# Patient Record
Sex: MALE | Race: WHITE | Employment: OTHER | URBAN - NONMETROPOLITAN AREA
[De-identification: names, ages, dates, MRNs, and addresses within clinical notes are randomized per-mention and may not be internally consistent; named-entity substitution may affect disease eponyms.]

---

## 2021-07-05 ENCOUNTER — HOSPITAL ENCOUNTER (EMERGENCY)
Age: 41
Discharge: HOME | End: 2021-07-05
Attending: FAMILY MEDICINE
Payer: MEDICAID

## 2021-07-05 VITALS
SYSTOLIC BLOOD PRESSURE: 150 MMHG | DIASTOLIC BLOOD PRESSURE: 97 MMHG | RESPIRATION RATE: 16 BRPM | WEIGHT: 210 LBS | TEMPERATURE: 98.1 F | HEART RATE: 97 BPM | OXYGEN SATURATION: 97 %

## 2021-07-05 DIAGNOSIS — M25.572 LEFT ANKLE PAIN, UNSPECIFIED CHRONICITY: Primary | ICD-10-CM

## 2021-07-05 PROCEDURE — 99283 EMERGENCY DEPT VISIT LOW MDM: CPT

## 2021-07-05 RX ORDER — HYDROCODONE BITARTRATE AND ACETAMINOPHEN 5; 325 MG/1; MG/1
1 TABLET ORAL EVERY 8 HOURS PRN
Qty: 6 TABLET | Refills: 0 | Status: SHIPPED | OUTPATIENT
Start: 2021-07-05 | End: 2021-07-07

## 2021-07-05 RX ORDER — IBUPROFEN 800 MG/1
800 TABLET ORAL EVERY 8 HOURS PRN
Qty: 30 TABLET | Refills: 0 | Status: SHIPPED | OUTPATIENT
Start: 2021-07-05

## 2021-07-05 ASSESSMENT — PAIN DESCRIPTION - ORIENTATION: ORIENTATION: LEFT;POSTERIOR

## 2021-07-05 ASSESSMENT — PAIN SCALES - GENERAL: PAINLEVEL_OUTOF10: 7

## 2021-07-05 ASSESSMENT — PAIN DESCRIPTION - PAIN TYPE: TYPE: ACUTE PAIN

## 2021-07-05 ASSESSMENT — PAIN DESCRIPTION - LOCATION: LOCATION: ANKLE

## 2021-07-05 ASSESSMENT — PAIN DESCRIPTION - DESCRIPTORS: DESCRIPTORS: ACHING

## 2021-07-05 NOTE — ED PROVIDER NOTES
[facility]  eMERGENCY dEPARTMENT eNCOUnter          [address] [facility]       Chief Complaint   Patient presents with    Ankle Pain     off and on pain for 2 years with active treatment at [facility] and physical therapy. No new injury       Nurses Notes reviewed and I agree except as noted in the HPI. HISTORY OF PRESENT ILLNESS    Raulito Still is a 35 y.o. male who presents to the emergency room with complaint of ankle pain, patient has been having ankle pain on and off for the past 2 years, has been seeing podiatry at [facility], who referred him to pain management, patient states he was referred to pain management in [address] [place] [address] but they were unable to see him due to his out-of-state insurance. Patient states he was told by his podiatrist to go to the emergency room if he needed additional pain medication, per patient. Patient denies any recent trauma or falls or other causes for any new pain. PCP: none listed  Podiatry: Dr. Bekah Wick [facility]   All other systems reviewed and are negative. PAST MEDICAL HISTORY    has a past medical history of Achilles tendinitis. SURGICAL HISTORY      has a past surgical history that includes Leg Surgery (Right). CURRENT MEDICATIONS       Discharge Medication List as of 7/5/2021  3:12 PM          ALLERGIES     has No Known Allergies. FAMILY HISTORY     has no family status information on file. family history is not on file. SOCIAL HISTORY      reports that he has been smoking cigarettes. He has been smoking about 0.50 packs per day. He has never used smokeless tobacco. He reports that he does not drink alcohol and does not use drugs. PHYSICAL EXAM     INITIAL VITALS:  weight is 210 lb (95.3 kg). His temperature is 98.1 °F (36.7 °C). His blood pressure is 150/97 (abnormal) and his pulse is 97. His respiration is 16 and oxygen saturation is 97%.     Physical Exam   Constitutional: Patient is oriented to person, place, and time. Patient appears well-developed and well-nourished. Patient is active and cooperative. Neck: Full passive range of motion without pain and phonation normal.   Cardiovascular:  Normal rate, regular rhythm and intact distal pulses. Pulses: Left DP pulse  2+   Pulmonary/Chest: Effort normal. No tachypnea and no bradypnea. Abdominal:  Patient without distension  Musculoskeletal:   Focused examination of patient's left ankle shows subjective tenderness about pain all over the posterior aspect, there is no overlying tegmen aberration, no pain to palpation over the malleoli calcaneus or fifth metatarsal, no pain to palpation of the distal tib-fib, distal CSM intact    Except as otherwise noted, negative acute trauma or deformity,  apparent full range of motion and normal strength all extremities appropriate to age. Neurological: Patient is alert and oriented to person, place, and time. patient displays no tremor. Patient displays no seizure activity. Skin: Skin is warm and dry. Patient is not diaphoretic. Psychiatric: Patient has a normal mood and affect.  Patient speech is normal and behavior is normal. Cognition and memory are normal.    DIFFERENTIAL DIAGNOSIS:   Chronic pain    DIAGNOSTIC RESULTS           RADIOLOGY: non-plain film images(s) such as CT, Ultrasound and MRI are read by the radiologist.  No orders to display       LABS:   Labs Reviewed - No data to display    EMERGENCY DEPARTMENT COURSE:   Vitals:    Vitals:    07/05/21 1438   BP: (!) 150/97   Pulse: 97   Resp: 16   Temp: 98.1 °F (36.7 °C)   SpO2: 97%   Weight: 210 lb (95.3 kg)     OARRS = 290, 2 prescriptions 6/8/2021 for Norco #16, 6/4 Norco #12, 6/1 Norco #16, 5/18 tramadol #10, via podiatry    Patient history and physical exam taken at bedside, as there is no preceding trauma do not feel x-rays would be of additional benefit, we discussed patient's longer-term pain control needs, would advise patient to try additional pain management groups and recontact his podiatrist regarding the insurance difficulty with the group that he was referred to, and will give patient referral to pain management at this facility. Discussed at baseline icing the area down, Motrin/Tylenol, will give patient a limited amount of Norco 5/325 to allow 1 every 8 hours for 2 days, to allow patient contact time to contact his podiatrist and/or pain management group. Patient will be discharged at this time      FINAL IMPRESSION      1. Left ankle pain, unspecified chronicity          DISPOSITION/PLAN   D/c    PATIENT REFERRED TO:  Lianet Lujan DPM  [address] [facility]  [phone]    Call       Derik Roman MD  [address] [place] [address]  [phone]    Call         DISCHARGE MEDICATIONS:  Discharge Medication List as of 7/5/2021  3:12 PM      START taking these medications    Details   HYDROcodone-acetaminophen (NORCO) 5-325 MG per tablet Take 1 tablet by mouth every 8 hours as needed for Pain for up to 6 doses. Intended supply: 3 days. Take lowest dose possible to manage pain, Disp-6 tablet, R-0Print      ibuprofen (ADVIL;MOTRIN) 800 MG tablet Take 1 tablet by mouth every 8 hours as needed for Pain, Disp-30 tablet, R-0Print                 Summation      Patient Course:  D/c    ED Medications administered this visit:  Medications - No data to display    New Prescriptions from this visit:    Discharge Medication List as of 7/5/2021  3:12 PM      START taking these medications    Details   HYDROcodone-acetaminophen (NORCO) 5-325 MG per tablet Take 1 tablet by mouth every 8 hours as needed for Pain for up to 6 doses. Intended supply: 3 days.  Take lowest dose possible to manage pain, Disp-6 tablet, R-0Print      ibuprofen (ADVIL;MOTRIN) 800 MG tablet Take 1 tablet by mouth every 8 hours as needed for Pain, Disp-30 tablet, R-0Print             Follow-up:  Lianet Lujan DPM  [address] [place] [address], [place]  [address] [place] [address]  [phone]    Call           Final Impression:   1.  Left ankle pain, unspecified chronicity               (Please note that portions of this note were completed with a voice recognition program.  Efforts were made to edit the dictations but occasionally words are mis-transcribed.)    Caprice Barthel, MD Caprice Barthel, MD  07/05/21 3377

## 2021-07-30 ENCOUNTER — OFFICE VISIT (OUTPATIENT)
Dept: PAIN MANAGEMENT | Age: 41
End: 2021-07-30
Payer: MEDICAID

## 2021-07-30 VITALS — DIASTOLIC BLOOD PRESSURE: 84 MMHG | OXYGEN SATURATION: 96 % | WEIGHT: 209.6 LBS | SYSTOLIC BLOOD PRESSURE: 130 MMHG

## 2021-07-30 DIAGNOSIS — M25.572 CHRONIC PAIN OF LEFT ANKLE: Primary | ICD-10-CM

## 2021-07-30 DIAGNOSIS — G89.29 CHRONIC PAIN OF LEFT ANKLE: Primary | ICD-10-CM

## 2021-07-30 PROCEDURE — 99204 OFFICE O/P NEW MOD 45 MIN: CPT | Performed by: PHYSICAL MEDICINE & REHABILITATION

## 2021-07-30 RX ORDER — GABAPENTIN 300 MG/1
300 CAPSULE ORAL 3 TIMES DAILY
Qty: 90 CAPSULE | Refills: 0 | Status: SHIPPED | OUTPATIENT
Start: 2021-07-30 | End: 2021-08-29

## 2021-07-30 RX ORDER — MELOXICAM 15 MG/1
15 TABLET ORAL DAILY
Qty: 30 TABLET | Refills: 3 | Status: SHIPPED | OUTPATIENT
Start: 2021-07-30

## 2021-07-30 NOTE — PATIENT INSTRUCTIONS
Gabapentin instruction:  Anti seizure medication which is being used for nerve pain    Take one capsule at bed (10 pm)x 5 days then twice a day (10 pm, 2 pm)  for 5 days then three times a day(10 pm, 2 pm, 8 am)  The most common side effects include drowsiness or dizziness, memory issues among others. To reduce the side effect of drowsiness or dizziness or sleepiness in the morning,  start at bedtime. If the most effective dose is once a day, then take it once a day. Otherwise, continue increasing the dose until three times a day. (900 mg per day)     Max dose is 3600 per day.       ___________________________________________________________     Trial of Meloxicam ( mobic )       Stop all other NSAIDs,like naproxen, ibuprofen as this medication will replace those meds. Please monitor blood pressure and evidence of acid reflux,  especially if you are going to continue the prescription NSAIDs for more than a week. Stop if you have reaction(s) and call my office.

## 2021-07-30 NOTE — PROGRESS NOTES
Pain Management Consultation    [address] [facility]   [address] [address]  Dept: [phone]    No referring provider defined for this encounter. Maik Sands is a 35 y.o. male, who came today due to  Ankle pain    Cause of the symptom(s): degenerative (arthritis)    Onset: 2years    Quality of Symptoms: Sharp and throbbing    Aggravating factors: Walking/stairs    Relieving factors: lying down and rest    Red Flags: Nothing    Treatment done: physical therapy injection, anti-inflammatory medication and steroid      Current pain level: 7 on the scale of 0-10 ( 10 being worst)       No Known Allergies    Social History     Socioeconomic History    Marital status: Single     Spouse name: Not on file    Number of children: Not on file    Years of education: Not on file    Highest education level: Not on file   Occupational History    Not on file   Tobacco Use    Smoking status: Current Every Day Smoker     Packs/day: 0.50     Types: Cigarettes    Smokeless tobacco: Never Used   Vaping Use    Vaping Use: Never used   Substance and Sexual Activity    Alcohol use: Never    Drug use: Never    Sexual activity: Not on file   Other Topics Concern    Not on file   Social History Narrative    Not on file     Social Determinants of Health     Financial Resource Strain:     Difficulty of Paying Living Expenses:    Food Insecurity:     Worried About [address] in the Last Year:     [address] in the Last Year:    Transportation Needs:     Lack of Transportation (Medical):      Lack of Transportation (Non-Medical):    Physical Activity:     Days of Exercise per Week:     Minutes of Exercise per Session:    Stress:     Feeling of Stress :    Social Connections:     Frequency of Communication with Friends and Family:     Frequency of Social Gatherings with Friends and Family:     Attends Protestant Services:     Active Member of Clubs or Organizations:     Attends Club or Organization Meetings:     Marital Status:    Intimate Partner Violence:     Fear of Current or Ex-Partner:     Emotionally Abused:     Physically Abused:     Sexually Abused:        Past Medical History:   Diagnosis Date    Achilles tendinitis        Past Surgical History:   Procedure Laterality Date    LEG SURGERY Right        Prior to Visit Medications    Medication Sig Taking? Authorizing Provider   ibuprofen (ADVIL;MOTRIN) 800 MG tablet Take 1 tablet by mouth every 8 hours as needed for Pain Yes Clayton Marion MD       History reviewed. No pertinent family history. Review of Systems : All systems reviewed, all unremarkable other than HPI/subjective. No change since last visit. Denies  fever, chills, infection or non healing wound. /84 (Site: Left Upper Arm, Position: Sitting, Cuff Size: Medium Adult)   Wt 209 lb 9.6 oz (95.1 kg)   SpO2 96%       Physical Exam  Constitutional:       General: He is not in acute distress. HENT:      Head: Atraumatic. Cardiovascular:      Rate and Rhythm: Normal rate. Pulmonary:      Effort: Pulmonary effort is normal. No respiratory distress. Musculoskeletal:      Cervical back: Neck supple. Comments: Limited ankle ROM due to pain    Skin:     General: Skin is warm. Neurological:      General: No focal deficit present. Mental Status: He is alert and oriented to person, place, and time. Psychiatric:         Behavior: Behavior normal.             Assessment and Plan:      Diagnosis Orders   1. Chronic pain of left ankle         Trial of Mobic and gabapentin. FF up in 4 weeks. If above treatment is not effective, will consider further diagnostics and treatment including injections. I have reviewed the chief complaint and HPI including the [facility] and Vital documentation by my staff and I agree with their documentation and have added where applicable.     Time spent with patient was  45  minutes. More than 50% was spent counseling/coordinating the patient's care.          Aylin Friedman MD   Spine Medicine/PM&R

## 2023-05-26 ENCOUNTER — HOSPITAL ENCOUNTER (EMERGENCY)
Age: 43
Discharge: HOME | End: 2023-05-26
Attending: EMERGENCY MEDICINE
Payer: COMMERCIAL

## 2023-05-26 VITALS
HEIGHT: 73 IN | HEART RATE: 76 BPM | RESPIRATION RATE: 18 BRPM | WEIGHT: 215.9 LBS | DIASTOLIC BLOOD PRESSURE: 88 MMHG | OXYGEN SATURATION: 96 % | SYSTOLIC BLOOD PRESSURE: 142 MMHG | TEMPERATURE: 98.3 F | BODY MASS INDEX: 28.61 KG/M2

## 2023-05-26 DIAGNOSIS — G89.29 CHRONIC PAIN OF BOTH ANKLES: Primary | ICD-10-CM

## 2023-05-26 DIAGNOSIS — M25.571 CHRONIC PAIN OF BOTH ANKLES: Primary | ICD-10-CM

## 2023-05-26 DIAGNOSIS — M25.572 CHRONIC PAIN OF BOTH ANKLES: Primary | ICD-10-CM

## 2023-05-26 PROCEDURE — 99283 EMERGENCY DEPT VISIT LOW MDM: CPT

## 2023-05-26 RX ORDER — NAPROXEN 375 MG/1
375 TABLET ORAL 2 TIMES DAILY WITH MEALS
Qty: 20 TABLET | Refills: 1 | Status: SHIPPED | OUTPATIENT
Start: 2023-05-26

## 2023-05-26 ASSESSMENT — PAIN SCALES - GENERAL: PAINLEVEL_OUTOF10: 8

## 2023-05-26 ASSESSMENT — PAIN - FUNCTIONAL ASSESSMENT
PAIN_FUNCTIONAL_ASSESSMENT: 0-10
PAIN_FUNCTIONAL_ASSESSMENT: ACTIVITIES ARE NOT PREVENTED

## 2023-05-26 ASSESSMENT — PAIN DESCRIPTION - LOCATION: LOCATION: FOOT

## 2023-05-26 ASSESSMENT — PAIN DESCRIPTION - ORIENTATION: ORIENTATION: RIGHT;LEFT

## 2023-05-26 ASSESSMENT — PAIN DESCRIPTION - PAIN TYPE: TYPE: ACUTE PAIN

## 2023-05-26 ASSESSMENT — PAIN DESCRIPTION - DESCRIPTORS: DESCRIPTORS: BURNING;ACHING;THROBBING

## 2023-05-26 NOTE — ED PROVIDER NOTES
eMERGENCY dEPARTMENT eNCOUnter        [address] [facility]    Chief Complaint   Patient presents with    Foot Problem     C/O bilateral foot pain -states he has arthritis and bone spurs. Pain worse today, hurts worse when he up on his feet for long periods working. Has appointment in June with podiatry        HPI    Mark Shafer is a 37 y.o. male who presents to ED with bilateral ankle pain. Patient states that he has arthritis and bone spurs in both ankles. Patient states the pain is worse today. 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 Patient has pain with walking. Patient also states that his feet and ankles are burning. REVIEW OF SYSTEMS    All systems reviewed and positives are in the HPI      PAST MEDICAL HISTORY    Past Medical History:   Diagnosis Date    Achilles tendinitis        SURGICAL HISTORY    Past Surgical History:   Procedure Laterality Date    LEG SURGERY Right        CURRENT MEDICATIONS    Current Outpatient Rx   Medication Sig Dispense Refill    naproxen (NAPROSYN) 375 MG tablet Take 1 tablet by mouth 2 times daily (with meals) 20 tablet 1    ibuprofen (ADVIL;MOTRIN) 800 MG tablet Take 1 tablet by mouth every 8 hours as needed for Pain 30 tablet 0       ALLERGIES    No Known Allergies    FAMILY HISTORY    History reviewed. No pertinent family history.     SOCIAL HISTORY    Social History     Socioeconomic History    Marital status: Single     Spouse name: None    Number of children: None    Years of education: None    Highest education level: None   Tobacco Use    Smoking status: Every Day     Packs/day: 1.00     Types: Cigarettes    Smokeless tobacco: Never   Vaping Use    Vaping Use: Never used   Substance and Sexual Activity    Alcohol use: Never    Drug use: Never       PHYSICAL EXAM    VITAL SIGNS: BP (!) 142/88   Pulse 76   Temp 98.3 °F (36.8 °C) (Oral)   Resp 18   Ht 6' 1\" (1.854 m)   Wt 215 lb 14.4 oz (97.9 kg)   SpO2 96%   BMI 28.48 kg/m²

## 2023-07-02 ENCOUNTER — HOSPITAL ENCOUNTER (EMERGENCY)
Age: 43
Discharge: HOME | End: 2023-07-02
Attending: FAMILY MEDICINE
Payer: COMMERCIAL

## 2023-07-02 VITALS
DIASTOLIC BLOOD PRESSURE: 96 MMHG | TEMPERATURE: 98.3 F | WEIGHT: 218 LBS | HEART RATE: 80 BPM | RESPIRATION RATE: 16 BRPM | OXYGEN SATURATION: 98 % | BODY MASS INDEX: 28.76 KG/M2 | SYSTOLIC BLOOD PRESSURE: 141 MMHG

## 2023-07-02 DIAGNOSIS — M25.572 CHRONIC PAIN OF LEFT ANKLE: Primary | ICD-10-CM

## 2023-07-02 DIAGNOSIS — M79.2 NEUROPATHIC PAIN OF ANKLE, LEFT: ICD-10-CM

## 2023-07-02 DIAGNOSIS — G89.29 CHRONIC PAIN OF LEFT ANKLE: Primary | ICD-10-CM

## 2023-07-02 PROCEDURE — 99283 EMERGENCY DEPT VISIT LOW MDM: CPT

## 2023-07-02 RX ORDER — HYDROCODONE BITARTRATE AND ACETAMINOPHEN 5; 325 MG/1; MG/1
1 TABLET ORAL EVERY 8 HOURS PRN
Qty: 9 TABLET | Refills: 0 | Status: SHIPPED | OUTPATIENT
Start: 2023-07-02 | End: 2023-07-05

## 2023-07-02 RX ORDER — GABAPENTIN 300 MG/1
CAPSULE ORAL
Qty: 21 CAPSULE | Refills: 0 | Status: SHIPPED | OUTPATIENT
Start: 2023-07-02 | End: 2023-07-10

## 2023-07-02 RX ORDER — CELECOXIB 200 MG/1
200 CAPSULE ORAL 2 TIMES DAILY
COMMUNITY

## 2023-07-02 ASSESSMENT — LIFESTYLE VARIABLES: HOW OFTEN DO YOU HAVE A DRINK CONTAINING ALCOHOL: MONTHLY OR LESS

## 2023-07-02 ASSESSMENT — PAIN DESCRIPTION - PAIN TYPE: TYPE: ACUTE PAIN

## 2023-07-02 ASSESSMENT — PAIN DESCRIPTION - ORIENTATION: ORIENTATION: LEFT

## 2023-07-02 ASSESSMENT — PAIN - FUNCTIONAL ASSESSMENT: PAIN_FUNCTIONAL_ASSESSMENT: 0-10

## 2023-07-02 ASSESSMENT — PAIN DESCRIPTION - LOCATION: LOCATION: ANKLE

## 2023-07-03 NOTE — ED PROVIDER NOTES
[address]      Pt Name: Navid Patel  MRN: [id number]  [facility] 1980  Date of evaluation: 7/2/2023  Provider: Baron Jade MD    CHIEF COMPLAINT       Chief Complaint   Patient presents with    Ankle Pain     Ankle pain for years         HISTORY OF PRESENT ILLNESS      Navid Patel is a 37 y.o. male who presents to the emergency department via private vehicle, patient states has been having ankle pain for years though however much less recently, denies any trauma or falls, denies any new or change in footwear. Patient has been seen podiatry recently had an injection in his ankle. Patient does not quantify the ankle pain in his ankle but does indicate significant. REVIEW OF SYSTEMS       Review of Systems   All other systems reviewed and are negative. PAST MEDICAL HISTORY     Past Medical History:   Diagnosis Date    Achilles tendinitis          SURGICAL HISTORY       Past Surgical History:   Procedure Laterality Date    LEG SURGERY Right          CURRENT MEDICATIONS       Discharge Medication List as of 7/2/2023  9:15 AM        CONTINUE these medications which have NOT CHANGED    Details   celecoxib (CELEBREX) 200 MG capsule Take 1 capsule by mouth 2 times dailyHistorical Med      naproxen (NAPROSYN) 375 MG tablet Take 1 tablet by mouth 2 times daily (with meals), Disp-20 tablet, R-1Normal      ibuprofen (ADVIL;MOTRIN) 800 MG tablet Take 1 tablet by mouth every 8 hours as needed for Pain, Disp-30 tablet, R-0Print             ALLERGIES       Codeine    FAMILY HISTORY       History reviewed. No pertinent family history.        SOCIAL HISTORY       Social History     Tobacco Use    Smoking status: Every Day     Packs/day: 1.00     Types: Cigarettes    Smokeless tobacco: Never   Vaping Use    Vaping Use: Never used   Substance Use Topics    Alcohol use: Never    Drug use: Never         PHYSICAL EXAM       ED Triage Vitals [07/02/23 0910]   BP Temp Temp

## 2025-07-21 ENCOUNTER — APPOINTMENT (OUTPATIENT)
Dept: GENERAL RADIOLOGY | Age: 45
End: 2025-07-21
Payer: COMMERCIAL

## 2025-07-21 ENCOUNTER — HOSPITAL ENCOUNTER (EMERGENCY)
Age: 45
Discharge: ANOTHER HOSPITAL | End: 2025-07-21
Attending: EMERGENCY MEDICINE
Payer: COMMERCIAL

## 2025-07-21 VITALS
TEMPERATURE: 98 F | WEIGHT: 220 LBS | DIASTOLIC BLOOD PRESSURE: 89 MMHG | OXYGEN SATURATION: 96 % | HEIGHT: 73 IN | BODY MASS INDEX: 29.16 KG/M2 | RESPIRATION RATE: 18 BRPM | SYSTOLIC BLOOD PRESSURE: 158 MMHG | HEART RATE: 60 BPM
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
## 2025-07-21 DIAGNOSIS — S72.22XA CLOSED DISPLACED SUBTROCHANTERIC FRACTURE OF LEFT FEMUR, INITIAL ENCOUNTER (HCC): Primary | ICD-10-CM

## 2025-07-21 LAB
ALBUMIN SERPL-MCNC: 4.4 G/DL (ref 3.5–5.2)
ALBUMIN/GLOB SERPL: 1.8 {RATIO} (ref 1–2.5)
ALP SERPL-CCNC: 73 U/L (ref 40–129)
ALT SERPL-CCNC: 28 U/L (ref 5–41)
ANION GAP SERPL CALCULATED.3IONS-SCNC: 20 MMOL/L (ref 9–17)
AST SERPL-CCNC: 34 U/L
BASOPHILS # BLD: 0.04 K/UL (ref 0–0.2)
BASOPHILS NFR BLD: 1 % (ref 0–2)
BILIRUB SERPL-MCNC: 0.5 MG/DL (ref 0.3–1.2)
BUN SERPL-MCNC: 18 MG/DL (ref 6–20)
CALCIUM SERPL-MCNC: 9.4 MG/DL (ref 8.6–10.4)
CHLORIDE SERPL-SCNC: 102 MMOL/L (ref 98–107)
CO2 SERPL-SCNC: 21 MMOL/L (ref 20–31)
CREAT SERPL-MCNC: 1 MG/DL (ref 0.7–1.2)
EOSINOPHIL # BLD: 0.14 K/UL (ref 0–0.4)
EOSINOPHILS RELATIVE PERCENT: 2 % (ref 0–5)
ERYTHROCYTE [DISTWIDTH] IN BLOOD BY AUTOMATED COUNT: 12.1 % (ref 12.1–15.2)
GFR, ESTIMATED: >90 ML/MIN/1.73M2
GLUCOSE SERPL-MCNC: 101 MG/DL (ref 70–99)
HCT VFR BLD AUTO: 41.6 % (ref 41–53)
HGB BLD-MCNC: 13.9 G/DL (ref 13.5–17.5)
IMM GRANULOCYTES # BLD AUTO: 0.01 K/UL (ref 0–0.3)
IMM GRANULOCYTES NFR BLD: 0 % (ref 0–5)
INR PPP: 1
LYMPHOCYTES NFR BLD: 1.76 K/UL (ref 1–4.8)
LYMPHOCYTES RELATIVE PERCENT: 29 % (ref 13–44)
MCH RBC QN AUTO: 31.2 PG (ref 26–34)
MCHC RBC AUTO-ENTMCNC: 33.4 G/DL (ref 31–37)
MCV RBC AUTO: 93.3 FL (ref 80–100)
MONOCYTES NFR BLD: 0.55 K/UL (ref 0–1)
MONOCYTES NFR BLD: 9 % (ref 5–9)
NEUTROPHILS NFR BLD: 59 % (ref 39–75)
NEUTS SEG NFR BLD: 3.59 K/UL (ref 2.1–6.5)
PARTIAL THROMBOPLASTIN TIME: 29.6 SEC (ref 23.9–33.8)
PLATELET # BLD AUTO: 271 K/UL (ref 140–450)
PMV BLD AUTO: 10.6 FL (ref 6–12)
POTASSIUM SERPL-SCNC: 3.4 MMOL/L (ref 3.7–5.3)
PROT SERPL-MCNC: 6.9 G/DL (ref 6.4–8.3)
PROTHROMBIN TIME: 13.8 SEC (ref 11.5–14.2)
RBC # BLD AUTO: 4.46 M/UL (ref 4.5–5.9)
SODIUM SERPL-SCNC: 143 MMOL/L (ref 135–144)
WBC OTHER # BLD: 6.1 K/UL (ref 3.5–11)

## 2025-07-21 PROCEDURE — 85730 THROMBOPLASTIN TIME PARTIAL: CPT

## 2025-07-21 PROCEDURE — 85610 PROTHROMBIN TIME: CPT

## 2025-07-21 PROCEDURE — 99285 EMERGENCY DEPT VISIT HI MDM: CPT

## 2025-07-21 PROCEDURE — 2580000003 HC RX 258: Performed by: EMERGENCY MEDICINE

## 2025-07-21 PROCEDURE — 85025 COMPLETE CBC W/AUTO DIFF WBC: CPT

## 2025-07-21 PROCEDURE — 93005 ELECTROCARDIOGRAM TRACING: CPT | Performed by: EMERGENCY MEDICINE

## 2025-07-21 PROCEDURE — 80053 COMPREHEN METABOLIC PANEL: CPT

## 2025-07-21 PROCEDURE — 96374 THER/PROPH/DIAG INJ IV PUSH: CPT

## 2025-07-21 PROCEDURE — 96376 TX/PRO/DX INJ SAME DRUG ADON: CPT

## 2025-07-21 PROCEDURE — 73502 X-RAY EXAM HIP UNI 2-3 VIEWS: CPT

## 2025-07-21 PROCEDURE — 96375 TX/PRO/DX INJ NEW DRUG ADDON: CPT

## 2025-07-21 PROCEDURE — 6360000002 HC RX W HCPCS: Performed by: EMERGENCY MEDICINE

## 2025-07-21 RX ORDER — SODIUM CHLORIDE 9 MG/ML
INJECTION, SOLUTION INTRAVENOUS CONTINUOUS
Status: DISCONTINUED | OUTPATIENT
Start: 2025-07-21 | End: 2025-07-22 | Stop reason: HOSPADM

## 2025-07-21 RX ORDER — FENTANYL CITRATE 50 UG/ML
100 INJECTION, SOLUTION INTRAMUSCULAR; INTRAVENOUS ONCE
Refills: 0 | Status: COMPLETED | OUTPATIENT
Start: 2025-07-21 | End: 2025-07-21

## 2025-07-21 RX ORDER — INDOMETHACIN 50 MG/1
50 CAPSULE ORAL
COMMUNITY
Start: 2025-05-06

## 2025-07-21 RX ADMIN — FENTANYL CITRATE 100 MCG: 50 INJECTION, SOLUTION INTRAMUSCULAR; INTRAVENOUS at 19:56

## 2025-07-21 RX ADMIN — HYDROMORPHONE HYDROCHLORIDE 1 MG: 1 INJECTION, SOLUTION INTRAMUSCULAR; INTRAVENOUS; SUBCUTANEOUS at 22:43

## 2025-07-21 RX ADMIN — HYDROMORPHONE HYDROCHLORIDE 1 MG: 1 INJECTION, SOLUTION INTRAMUSCULAR; INTRAVENOUS; SUBCUTANEOUS at 21:09

## 2025-07-21 ASSESSMENT — LIFESTYLE VARIABLES
HOW OFTEN DO YOU HAVE A DRINK CONTAINING ALCOHOL: 4 OR MORE TIMES A WEEK
HOW MANY STANDARD DRINKS CONTAINING ALCOHOL DO YOU HAVE ON A TYPICAL DAY: 1 OR 2

## 2025-07-21 ASSESSMENT — PAIN DESCRIPTION - ORIENTATION
ORIENTATION: LEFT;UPPER
ORIENTATION: LEFT;UPPER
ORIENTATION: LEFT
ORIENTATION: LEFT;UPPER

## 2025-07-21 ASSESSMENT — PAIN DESCRIPTION - DESCRIPTORS
DESCRIPTORS: THROBBING;TIGHTNESS
DESCRIPTORS: TIGHTNESS
DESCRIPTORS: DISCOMFORT
DESCRIPTORS: THROBBING;TIGHTNESS

## 2025-07-21 ASSESSMENT — PAIN DESCRIPTION - PAIN TYPE
TYPE: ACUTE PAIN
TYPE: ACUTE PAIN

## 2025-07-21 ASSESSMENT — PAIN SCALES - GENERAL
PAINLEVEL_OUTOF10: 10
PAINLEVEL_OUTOF10: 9
PAINLEVEL_OUTOF10: 10
PAINLEVEL_OUTOF10: 9

## 2025-07-21 ASSESSMENT — PAIN DESCRIPTION - LOCATION
LOCATION: LEG;HIP
LOCATION: LEG;HIP
LOCATION: HIP
LOCATION: LEG

## 2025-07-21 ASSESSMENT — PAIN - FUNCTIONAL ASSESSMENT
PAIN_FUNCTIONAL_ASSESSMENT: 0-10
PAIN_FUNCTIONAL_ASSESSMENT: PREVENTS OR INTERFERES WITH ALL ACTIVE AND SOME PASSIVE ACTIVITIES
PAIN_FUNCTIONAL_ASSESSMENT: PREVENTS OR INTERFERES WITH ALL ACTIVE AND SOME PASSIVE ACTIVITIES

## 2025-07-21 ASSESSMENT — PAIN DESCRIPTION - FREQUENCY
FREQUENCY: CONTINUOUS
FREQUENCY: CONTINUOUS

## 2025-07-21 NOTE — ED PROVIDER NOTES
FAMILY HISTORY    History reviewed. No pertinent family history.    SOCIAL HISTORY    Social History     Socioeconomic History    Marital status: Single     Spouse name: None    Number of children: None    Years of education: None    Highest education level: None   Tobacco Use    Smoking status: Every Day     Current packs/day: 1.00     Types: Cigarettes    Smokeless tobacco: Never   Vaping Use    Vaping status: Never Used   Substance and Sexual Activity    Alcohol use: Never    Drug use: Never       PHYSICAL EXAM    VITAL SIGNS: BP (!) 154/75   Pulse 60   Temp 98 °F (36.7 °C) (Oral)   Resp 18   Ht 1.854 m (6' 1\")   Wt 99.8 kg (220 lb)   SpO2 96%   BMI 29.03 kg/m²   Constitutional:  Well developed, well nourished, no acute distress, non-toxic appearance   HENT:  Atraumatic, external ears normal, nose normal, oropharynx moist.  Neck- normal range of motion, no tenderness, supple   Respiratory:  No respiratory distress, normal breath sounds.   Cardiovascular:  Normal rate, normal rhythm, no murmurs, no gallops, no rubs   GI:  Soft, nondistended, normal bowel sounds, nontender   Musculoskeletal: Left thigh and left hip pain.  Left leg is externally rotated  Integument:  Well hydrated, no rash   Neurologic: Alert and oriented x 3 no focal deficits    RADIOLOGY/PROCEDURES    XR HIP 2-3 VW W PELVIS LEFT   Final Result   FINDINGS/IMPRESSION:      Left proximal femur comminuted, moderately displaced, fracture with adjacent    soft tissue edema/ hematoma.      No additional fractures appreciated on limited views.         XR FEMUR LEFT (MIN 2 VIEWS)    (Results Pending)         Labs  Labs Reviewed   CBC WITH AUTO DIFFERENTIAL - Abnormal; Notable for the following components:       Result Value    RBC 4.46 (*)     All other components within normal limits   COMPREHENSIVE METABOLIC PANEL - Abnormal; Notable for the following components:    Potassium 3.4 (*)     Anion Gap 20 (*)     Glucose 101 (*)     All other

## 2025-07-22 ENCOUNTER — APPOINTMENT (OUTPATIENT)
Dept: GENERAL RADIOLOGY | Age: 45
DRG: 482 | End: 2025-07-22
Payer: COMMERCIAL

## 2025-07-22 ENCOUNTER — ANESTHESIA (OUTPATIENT)
Dept: OPERATING ROOM | Age: 45
End: 2025-07-22
Payer: COMMERCIAL

## 2025-07-22 ENCOUNTER — HOSPITAL ENCOUNTER (INPATIENT)
Age: 45
LOS: 4 days | Discharge: NURSING, REHAB OR LONG-TERM CARE | DRG: 482 | End: 2025-07-26
Attending: EMERGENCY MEDICINE | Admitting: STUDENT IN AN ORGANIZED HEALTH CARE EDUCATION/TRAINING PROGRAM
Payer: COMMERCIAL

## 2025-07-22 ENCOUNTER — ANESTHESIA EVENT (OUTPATIENT)
Dept: OPERATING ROOM | Age: 45
End: 2025-07-22
Payer: COMMERCIAL

## 2025-07-22 ENCOUNTER — APPOINTMENT (OUTPATIENT)
Dept: CT IMAGING | Age: 45
DRG: 482 | End: 2025-07-22
Payer: COMMERCIAL

## 2025-07-22 DIAGNOSIS — G89.18 POST-OP PAIN: ICD-10-CM

## 2025-07-22 DIAGNOSIS — S72.352A CLOSED DISPLACED COMMINUTED FRACTURE OF SHAFT OF LEFT FEMUR, INITIAL ENCOUNTER (HCC): Primary | ICD-10-CM

## 2025-07-22 PROBLEM — S72.22XA CLOSED LEFT SUBTROCHANTERIC FEMUR FRACTURE, INITIAL ENCOUNTER (HCC): Status: ACTIVE | Noted: 2025-07-22

## 2025-07-22 PROBLEM — S72.142A INTERTROCHANTERIC FRACTURE OF LEFT FEMUR, CLOSED, INITIAL ENCOUNTER (HCC): Status: ACTIVE | Noted: 2025-07-22

## 2025-07-22 LAB
25(OH)D3 SERPL-MCNC: 13.1 NG/ML (ref 30–100)
EKG ATRIAL RATE: 61 BPM
EKG P AXIS: 52 DEGREES
EKG P-R INTERVAL: 188 MS
EKG Q-T INTERVAL: 384 MS
EKG QRS DURATION: 98 MS
EKG QTC CALCULATION (BAZETT): 386 MS
EKG R AXIS: 266 DEGREES
EKG T AXIS: 10 DEGREES
EKG VENTRICULAR RATE: 61 BPM

## 2025-07-22 PROCEDURE — 2500000003 HC RX 250 WO HCPCS: Performed by: NURSE ANESTHETIST, CERTIFIED REGISTERED

## 2025-07-22 PROCEDURE — 73552 X-RAY EXAM OF FEMUR 2/>: CPT

## 2025-07-22 PROCEDURE — 73560 X-RAY EXAM OF KNEE 1 OR 2: CPT

## 2025-07-22 PROCEDURE — 6370000000 HC RX 637 (ALT 250 FOR IP)

## 2025-07-22 PROCEDURE — 3700000000 HC ANESTHESIA ATTENDED CARE: Performed by: STUDENT IN AN ORGANIZED HEALTH CARE EDUCATION/TRAINING PROGRAM

## 2025-07-22 PROCEDURE — 3700000001 HC ADD 15 MINUTES (ANESTHESIA): Performed by: STUDENT IN AN ORGANIZED HEALTH CARE EDUCATION/TRAINING PROGRAM

## 2025-07-22 PROCEDURE — 82306 VITAMIN D 25 HYDROXY: CPT

## 2025-07-22 PROCEDURE — 3600000004 HC SURGERY LEVEL 4 BASE: Performed by: STUDENT IN AN ORGANIZED HEALTH CARE EDUCATION/TRAINING PROGRAM

## 2025-07-22 PROCEDURE — 2720000010 HC SURG SUPPLY STERILE: Performed by: STUDENT IN AN ORGANIZED HEALTH CARE EDUCATION/TRAINING PROGRAM

## 2025-07-22 PROCEDURE — 2W5PX0Z REMOVAL OF TRACTION APPARATUS ON LEFT UPPER LEG: ICD-10-PCS | Performed by: STUDENT IN AN ORGANIZED HEALTH CARE EDUCATION/TRAINING PROGRAM

## 2025-07-22 PROCEDURE — 96374 THER/PROPH/DIAG INJ IV PUSH: CPT

## 2025-07-22 PROCEDURE — 73700 CT LOWER EXTREMITY W/O DYE: CPT

## 2025-07-22 PROCEDURE — 2580000003 HC RX 258

## 2025-07-22 PROCEDURE — 6360000002 HC RX W HCPCS: Performed by: NURSE ANESTHETIST, CERTIFIED REGISTERED

## 2025-07-22 PROCEDURE — 99285 EMERGENCY DEPT VISIT HI MDM: CPT

## 2025-07-22 PROCEDURE — 6360000002 HC RX W HCPCS

## 2025-07-22 PROCEDURE — 2709999900 HC NON-CHARGEABLE SUPPLY: Performed by: STUDENT IN AN ORGANIZED HEALTH CARE EDUCATION/TRAINING PROGRAM

## 2025-07-22 PROCEDURE — 2580000003 HC RX 258: Performed by: NURSE ANESTHETIST, CERTIFIED REGISTERED

## 2025-07-22 PROCEDURE — 7100000000 HC PACU RECOVERY - FIRST 15 MIN: Performed by: STUDENT IN AN ORGANIZED HEALTH CARE EDUCATION/TRAINING PROGRAM

## 2025-07-22 PROCEDURE — 0QH736Z INSERTION OF INTRAMEDULLARY INTERNAL FIXATION DEVICE INTO LEFT UPPER FEMUR, PERCUTANEOUS APPROACH: ICD-10-PCS | Performed by: STUDENT IN AN ORGANIZED HEALTH CARE EDUCATION/TRAINING PROGRAM

## 2025-07-22 PROCEDURE — 2W6RX0Z TRACTION OF LEFT LOWER LEG USING TRACTION APPARATUS: ICD-10-PCS | Performed by: STUDENT IN AN ORGANIZED HEALTH CARE EDUCATION/TRAINING PROGRAM

## 2025-07-22 PROCEDURE — 7100000001 HC PACU RECOVERY - ADDTL 15 MIN: Performed by: STUDENT IN AN ORGANIZED HEALTH CARE EDUCATION/TRAINING PROGRAM

## 2025-07-22 PROCEDURE — C1713 ANCHOR/SCREW BN/BN,TIS/BN: HCPCS | Performed by: STUDENT IN AN ORGANIZED HEALTH CARE EDUCATION/TRAINING PROGRAM

## 2025-07-22 PROCEDURE — 2500000003 HC RX 250 WO HCPCS: Performed by: STUDENT IN AN ORGANIZED HEALTH CARE EDUCATION/TRAINING PROGRAM

## 2025-07-22 PROCEDURE — 96375 TX/PRO/DX INJ NEW DRUG ADDON: CPT

## 2025-07-22 PROCEDURE — 76376 3D RENDER W/INTRP POSTPROCES: CPT

## 2025-07-22 PROCEDURE — 93005 ELECTROCARDIOGRAM TRACING: CPT

## 2025-07-22 PROCEDURE — 1200000000 HC SEMI PRIVATE

## 2025-07-22 PROCEDURE — 6360000002 HC RX W HCPCS: Performed by: ANESTHESIOLOGY

## 2025-07-22 PROCEDURE — 3600000014 HC SURGERY LEVEL 4 ADDTL 15MIN: Performed by: STUDENT IN AN ORGANIZED HEALTH CARE EDUCATION/TRAINING PROGRAM

## 2025-07-22 PROCEDURE — 71045 X-RAY EXAM CHEST 1 VIEW: CPT

## 2025-07-22 PROCEDURE — 2500000003 HC RX 250 WO HCPCS

## 2025-07-22 DEVICE — TRIGEN LOW PROFILE SCREW 5.0MM X 62.5MM
Type: IMPLANTABLE DEVICE | Site: FEMUR | Status: FUNCTIONAL
Brand: TRIGEN

## 2025-07-22 DEVICE — TRIGEN LOW PROFILE SCREW 5.0MM X 55MM
Type: IMPLANTABLE DEVICE | Site: FEMUR | Status: FUNCTIONAL
Brand: TRIGEN

## 2025-07-22 DEVICE — META-TAN LAG/COMPRESSION SCREW KIT 105MM/100MM
Type: IMPLANTABLE DEVICE | Site: FEMUR | Status: FUNCTIONAL
Brand: TRIGEN

## 2025-07-22 DEVICE — META-TAN NAIL 10MM X 46CM LEFT
Type: IMPLANTABLE DEVICE | Site: FEMUR | Status: FUNCTIONAL
Brand: TRIGEN

## 2025-07-22 DEVICE — META-TAN 0MM SET SCREW
Type: IMPLANTABLE DEVICE | Site: FEMUR | Status: FUNCTIONAL
Brand: TRIGEN

## 2025-07-22 RX ORDER — KETOROLAC TROMETHAMINE 15 MG/ML
15 INJECTION, SOLUTION INTRAMUSCULAR; INTRAVENOUS EVERY 6 HOURS PRN
Status: COMPLETED | OUTPATIENT
Start: 2025-07-22 | End: 2025-07-22

## 2025-07-22 RX ORDER — MEPERIDINE HYDROCHLORIDE 50 MG/ML
12.5 INJECTION INTRAMUSCULAR; INTRAVENOUS; SUBCUTANEOUS EVERY 5 MIN PRN
Status: DISCONTINUED | OUTPATIENT
Start: 2025-07-22 | End: 2025-07-22 | Stop reason: HOSPADM

## 2025-07-22 RX ORDER — CEFAZOLIN SODIUM 1 G/3ML
INJECTION, POWDER, FOR SOLUTION INTRAMUSCULAR; INTRAVENOUS
Status: DISCONTINUED | OUTPATIENT
Start: 2025-07-22 | End: 2025-07-22 | Stop reason: SDUPTHER

## 2025-07-22 RX ORDER — ONDANSETRON 4 MG/1
4 TABLET, ORALLY DISINTEGRATING ORAL EVERY 8 HOURS PRN
Status: DISCONTINUED | OUTPATIENT
Start: 2025-07-22 | End: 2025-07-22 | Stop reason: SDUPTHER

## 2025-07-22 RX ORDER — SODIUM CHLORIDE 0.9 % (FLUSH) 0.9 %
10 SYRINGE (ML) INJECTION EVERY 12 HOURS SCHEDULED
Status: CANCELLED | OUTPATIENT
Start: 2025-07-22

## 2025-07-22 RX ORDER — DIPHENHYDRAMINE HYDROCHLORIDE 50 MG/ML
12.5 INJECTION, SOLUTION INTRAMUSCULAR; INTRAVENOUS
Status: DISCONTINUED | OUTPATIENT
Start: 2025-07-22 | End: 2025-07-22 | Stop reason: HOSPADM

## 2025-07-22 RX ORDER — FENTANYL CITRATE 50 UG/ML
50 INJECTION, SOLUTION INTRAMUSCULAR; INTRAVENOUS ONCE
Status: COMPLETED | OUTPATIENT
Start: 2025-07-22 | End: 2025-07-22

## 2025-07-22 RX ORDER — POLYETHYLENE GLYCOL 3350 17 G/17G
17 POWDER, FOR SOLUTION ORAL DAILY PRN
Status: DISCONTINUED | OUTPATIENT
Start: 2025-07-22 | End: 2025-07-22 | Stop reason: SDUPTHER

## 2025-07-22 RX ORDER — SODIUM CHLORIDE 0.9 % (FLUSH) 0.9 %
10 SYRINGE (ML) INJECTION EVERY 12 HOURS SCHEDULED
Status: DISCONTINUED | OUTPATIENT
Start: 2025-07-22 | End: 2025-07-26 | Stop reason: HOSPADM

## 2025-07-22 RX ORDER — OXYCODONE AND ACETAMINOPHEN 5; 325 MG/1; MG/1
1 TABLET ORAL EVERY 6 HOURS PRN
Qty: 28 TABLET | Refills: 0 | Status: SHIPPED | OUTPATIENT
Start: 2025-07-22 | End: 2025-07-24

## 2025-07-22 RX ORDER — ERGOCALCIFEROL 1.25 MG/1
50000 CAPSULE, LIQUID FILLED ORAL WEEKLY
Qty: 8 CAPSULE | Refills: 0 | Status: SHIPPED | OUTPATIENT
Start: 2025-07-22 | End: 2025-09-10

## 2025-07-22 RX ORDER — ONDANSETRON 2 MG/ML
INJECTION INTRAMUSCULAR; INTRAVENOUS
Status: DISCONTINUED | OUTPATIENT
Start: 2025-07-22 | End: 2025-07-22 | Stop reason: SDUPTHER

## 2025-07-22 RX ORDER — SODIUM CHLORIDE, SODIUM LACTATE, POTASSIUM CHLORIDE, CALCIUM CHLORIDE 600; 310; 30; 20 MG/100ML; MG/100ML; MG/100ML; MG/100ML
INJECTION, SOLUTION INTRAVENOUS
Status: DISCONTINUED | OUTPATIENT
Start: 2025-07-22 | End: 2025-07-22 | Stop reason: SDUPTHER

## 2025-07-22 RX ORDER — SODIUM CHLORIDE, SODIUM LACTATE, POTASSIUM CHLORIDE, CALCIUM CHLORIDE 600; 310; 30; 20 MG/100ML; MG/100ML; MG/100ML; MG/100ML
INJECTION, SOLUTION INTRAVENOUS CONTINUOUS
Status: CANCELLED | OUTPATIENT
Start: 2025-07-22

## 2025-07-22 RX ORDER — ONDANSETRON 4 MG/1
4 TABLET, ORALLY DISINTEGRATING ORAL EVERY 8 HOURS PRN
Status: DISCONTINUED | OUTPATIENT
Start: 2025-07-22 | End: 2025-07-26 | Stop reason: HOSPADM

## 2025-07-22 RX ORDER — OXYCODONE HYDROCHLORIDE 5 MG/1
2.5 TABLET ORAL EVERY 4 HOURS PRN
Status: DISCONTINUED | OUTPATIENT
Start: 2025-07-22 | End: 2025-07-23

## 2025-07-22 RX ORDER — OXYCODONE AND ACETAMINOPHEN 5; 325 MG/1; MG/1
1 TABLET ORAL EVERY 6 HOURS PRN
Qty: 28 TABLET | Refills: 0 | Status: SHIPPED | OUTPATIENT
Start: 2025-07-22 | End: 2025-07-22

## 2025-07-22 RX ORDER — ASPIRIN 81 MG/1
81 TABLET ORAL 2 TIMES DAILY
Qty: 70 TABLET | Refills: 0 | Status: SHIPPED | OUTPATIENT
Start: 2025-07-22 | End: 2025-08-26

## 2025-07-22 RX ORDER — ENOXAPARIN SODIUM 100 MG/ML
40 INJECTION SUBCUTANEOUS DAILY
Status: DISCONTINUED | OUTPATIENT
Start: 2025-07-23 | End: 2025-07-22

## 2025-07-22 RX ORDER — SODIUM CHLORIDE 0.9 % (FLUSH) 0.9 %
5-40 SYRINGE (ML) INJECTION PRN
Status: DISCONTINUED | OUTPATIENT
Start: 2025-07-22 | End: 2025-07-22 | Stop reason: HOSPADM

## 2025-07-22 RX ORDER — ONDANSETRON 2 MG/ML
4 INJECTION INTRAMUSCULAR; INTRAVENOUS EVERY 6 HOURS PRN
Status: DISCONTINUED | OUTPATIENT
Start: 2025-07-22 | End: 2025-07-22 | Stop reason: SDUPTHER

## 2025-07-22 RX ORDER — ACETAMINOPHEN 325 MG/1
650 TABLET ORAL EVERY 6 HOURS
Status: DISCONTINUED | OUTPATIENT
Start: 2025-07-22 | End: 2025-07-26 | Stop reason: HOSPADM

## 2025-07-22 RX ORDER — MAGNESIUM HYDROXIDE 1200 MG/15ML
LIQUID ORAL CONTINUOUS PRN
Status: DISCONTINUED | OUTPATIENT
Start: 2025-07-22 | End: 2025-07-22 | Stop reason: HOSPADM

## 2025-07-22 RX ORDER — PROPOFOL 10 MG/ML
INJECTION, EMULSION INTRAVENOUS
Status: DISCONTINUED | OUTPATIENT
Start: 2025-07-22 | End: 2025-07-22 | Stop reason: SDUPTHER

## 2025-07-22 RX ORDER — SODIUM CHLORIDE 9 MG/ML
INJECTION, SOLUTION INTRAVENOUS PRN
Status: CANCELLED | OUTPATIENT
Start: 2025-07-22

## 2025-07-22 RX ORDER — FENTANYL CITRATE 50 UG/ML
100 INJECTION, SOLUTION INTRAMUSCULAR; INTRAVENOUS ONCE
Refills: 0 | Status: COMPLETED | OUTPATIENT
Start: 2025-07-22 | End: 2025-07-22

## 2025-07-22 RX ORDER — LIDOCAINE HYDROCHLORIDE 10 MG/ML
20 INJECTION, SOLUTION INFILTRATION; PERINEURAL ONCE
Status: COMPLETED | OUTPATIENT
Start: 2025-07-22 | End: 2025-07-22

## 2025-07-22 RX ORDER — OXYCODONE HYDROCHLORIDE 5 MG/1
5 TABLET ORAL EVERY 4 HOURS PRN
Status: DISCONTINUED | OUTPATIENT
Start: 2025-07-22 | End: 2025-07-23

## 2025-07-22 RX ORDER — METHOCARBAMOL 100 MG/ML
1000 INJECTION, SOLUTION INTRAMUSCULAR; INTRAVENOUS ONCE
Status: COMPLETED | OUTPATIENT
Start: 2025-07-22 | End: 2025-07-22

## 2025-07-22 RX ORDER — KETOROLAC TROMETHAMINE 30 MG/ML
INJECTION, SOLUTION INTRAMUSCULAR; INTRAVENOUS
Status: DISCONTINUED | OUTPATIENT
Start: 2025-07-22 | End: 2025-07-22 | Stop reason: SDUPTHER

## 2025-07-22 RX ORDER — SODIUM CHLORIDE 0.9 % (FLUSH) 0.9 %
10 SYRINGE (ML) INJECTION PRN
Status: CANCELLED | OUTPATIENT
Start: 2025-07-22

## 2025-07-22 RX ORDER — DROPERIDOL 2.5 MG/ML
0.62 INJECTION, SOLUTION INTRAMUSCULAR; INTRAVENOUS
Status: DISCONTINUED | OUTPATIENT
Start: 2025-07-22 | End: 2025-07-22 | Stop reason: HOSPADM

## 2025-07-22 RX ORDER — POLYETHYLENE GLYCOL 3350 17 G/17G
17 POWDER, FOR SOLUTION ORAL DAILY PRN
Status: DISCONTINUED | OUTPATIENT
Start: 2025-07-22 | End: 2025-07-26 | Stop reason: HOSPADM

## 2025-07-22 RX ORDER — CYCLOBENZAPRINE HCL 10 MG
10 TABLET ORAL 3 TIMES DAILY PRN
Qty: 21 TABLET | Refills: 0 | Status: SHIPPED | OUTPATIENT
Start: 2025-07-22 | End: 2025-08-01

## 2025-07-22 RX ORDER — ASPIRIN 81 MG/1
81 TABLET ORAL 2 TIMES DAILY
Status: DISCONTINUED | OUTPATIENT
Start: 2025-07-23 | End: 2025-07-26 | Stop reason: HOSPADM

## 2025-07-22 RX ORDER — DOCUSATE SODIUM 100 MG/1
100 CAPSULE, LIQUID FILLED ORAL DAILY
Status: CANCELLED | OUTPATIENT
Start: 2025-07-22

## 2025-07-22 RX ORDER — SODIUM CHLORIDE, SODIUM LACTATE, POTASSIUM CHLORIDE, CALCIUM CHLORIDE 600; 310; 30; 20 MG/100ML; MG/100ML; MG/100ML; MG/100ML
INJECTION, SOLUTION INTRAVENOUS CONTINUOUS
Status: DISCONTINUED | OUTPATIENT
Start: 2025-07-22 | End: 2025-07-24

## 2025-07-22 RX ORDER — METOCLOPRAMIDE HYDROCHLORIDE 5 MG/ML
10 INJECTION INTRAMUSCULAR; INTRAVENOUS
Status: DISCONTINUED | OUTPATIENT
Start: 2025-07-22 | End: 2025-07-22 | Stop reason: HOSPADM

## 2025-07-22 RX ORDER — ENOXAPARIN SODIUM 100 MG/ML
40 INJECTION SUBCUTANEOUS DAILY
Status: CANCELLED | OUTPATIENT
Start: 2025-07-23

## 2025-07-22 RX ORDER — ONDANSETRON 2 MG/ML
4 INJECTION INTRAMUSCULAR; INTRAVENOUS EVERY 6 HOURS PRN
Status: CANCELLED | OUTPATIENT
Start: 2025-07-22

## 2025-07-22 RX ORDER — MIDAZOLAM HYDROCHLORIDE 2 MG/2ML
2 INJECTION, SOLUTION INTRAMUSCULAR; INTRAVENOUS ONCE
Status: COMPLETED | OUTPATIENT
Start: 2025-07-22 | End: 2025-07-22

## 2025-07-22 RX ORDER — ONDANSETRON 2 MG/ML
4 INJECTION INTRAMUSCULAR; INTRAVENOUS EVERY 6 HOURS PRN
Status: DISCONTINUED | OUTPATIENT
Start: 2025-07-22 | End: 2025-07-26 | Stop reason: HOSPADM

## 2025-07-22 RX ORDER — SODIUM CHLORIDE 9 MG/ML
INJECTION, SOLUTION INTRAVENOUS PRN
Status: DISCONTINUED | OUTPATIENT
Start: 2025-07-22 | End: 2025-07-22 | Stop reason: SDUPTHER

## 2025-07-22 RX ORDER — ASPIRIN 81 MG/1
81 TABLET, CHEWABLE ORAL DAILY
Status: DISCONTINUED | OUTPATIENT
Start: 2025-07-22 | End: 2025-07-22

## 2025-07-22 RX ORDER — ROCURONIUM BROMIDE 10 MG/ML
INJECTION, SOLUTION INTRAVENOUS
Status: DISCONTINUED | OUTPATIENT
Start: 2025-07-22 | End: 2025-07-22 | Stop reason: SDUPTHER

## 2025-07-22 RX ORDER — OXYCODONE HYDROCHLORIDE 5 MG/1
5 TABLET ORAL EVERY 4 HOURS PRN
Refills: 0 | Status: DISCONTINUED | OUTPATIENT
Start: 2025-07-22 | End: 2025-07-22 | Stop reason: SDUPTHER

## 2025-07-22 RX ORDER — ONDANSETRON 4 MG/1
4 TABLET, ORALLY DISINTEGRATING ORAL EVERY 8 HOURS PRN
Status: CANCELLED | OUTPATIENT
Start: 2025-07-22

## 2025-07-22 RX ORDER — KETOROLAC TROMETHAMINE 15 MG/ML
15 INJECTION, SOLUTION INTRAMUSCULAR; INTRAVENOUS EVERY 6 HOURS PRN
Status: CANCELLED | OUTPATIENT
Start: 2025-07-22 | End: 2025-07-23

## 2025-07-22 RX ORDER — SODIUM CHLORIDE 0.9 % (FLUSH) 0.9 %
5-40 SYRINGE (ML) INJECTION EVERY 12 HOURS SCHEDULED
Status: DISCONTINUED | OUTPATIENT
Start: 2025-07-22 | End: 2025-07-22 | Stop reason: HOSPADM

## 2025-07-22 RX ORDER — POLYETHYLENE GLYCOL 3350 17 G/17G
17 POWDER, FOR SOLUTION ORAL DAILY PRN
Status: CANCELLED | OUTPATIENT
Start: 2025-07-22

## 2025-07-22 RX ORDER — SODIUM CHLORIDE 9 MG/ML
INJECTION, SOLUTION INTRAVENOUS CONTINUOUS
Status: DISCONTINUED | OUTPATIENT
Start: 2025-07-22 | End: 2025-07-24

## 2025-07-22 RX ORDER — FENTANYL CITRATE 50 UG/ML
INJECTION, SOLUTION INTRAMUSCULAR; INTRAVENOUS
Status: DISCONTINUED | OUTPATIENT
Start: 2025-07-22 | End: 2025-07-22 | Stop reason: SDUPTHER

## 2025-07-22 RX ORDER — DOCUSATE SODIUM 100 MG/1
100 CAPSULE, LIQUID FILLED ORAL 2 TIMES DAILY
Qty: 28 CAPSULE | Refills: 0 | Status: SHIPPED | OUTPATIENT
Start: 2025-07-22 | End: 2025-08-05

## 2025-07-22 RX ORDER — LIDOCAINE HYDROCHLORIDE 10 MG/ML
INJECTION, SOLUTION INFILTRATION; PERINEURAL
Status: COMPLETED
Start: 2025-07-22 | End: 2025-07-22

## 2025-07-22 RX ORDER — SODIUM CHLORIDE 9 MG/ML
INJECTION, SOLUTION INTRAVENOUS PRN
Status: DISCONTINUED | OUTPATIENT
Start: 2025-07-22 | End: 2025-07-24

## 2025-07-22 RX ORDER — SODIUM CHLORIDE 9 MG/ML
INJECTION, SOLUTION INTRAVENOUS PRN
Status: DISCONTINUED | OUTPATIENT
Start: 2025-07-22 | End: 2025-07-22 | Stop reason: HOSPADM

## 2025-07-22 RX ORDER — DOCUSATE SODIUM 100 MG/1
100 CAPSULE, LIQUID FILLED ORAL DAILY
Status: DISCONTINUED | OUTPATIENT
Start: 2025-07-22 | End: 2025-07-26 | Stop reason: HOSPADM

## 2025-07-22 RX ORDER — ASPIRIN 81 MG/1
81 TABLET, CHEWABLE ORAL DAILY
Status: CANCELLED | OUTPATIENT
Start: 2025-07-22

## 2025-07-22 RX ORDER — OXYCODONE HYDROCHLORIDE 10 MG/1
10 TABLET ORAL EVERY 4 HOURS PRN
Refills: 0 | Status: DISCONTINUED | OUTPATIENT
Start: 2025-07-22 | End: 2025-07-22 | Stop reason: SDUPTHER

## 2025-07-22 RX ORDER — MIDAZOLAM HYDROCHLORIDE 1 MG/ML
INJECTION, SOLUTION INTRAMUSCULAR; INTRAVENOUS
Status: DISCONTINUED | OUTPATIENT
Start: 2025-07-22 | End: 2025-07-22 | Stop reason: SDUPTHER

## 2025-07-22 RX ORDER — HYDRALAZINE HYDROCHLORIDE 20 MG/ML
10 INJECTION INTRAMUSCULAR; INTRAVENOUS
Status: DISCONTINUED | OUTPATIENT
Start: 2025-07-22 | End: 2025-07-22 | Stop reason: HOSPADM

## 2025-07-22 RX ORDER — SODIUM CHLORIDE 0.9 % (FLUSH) 0.9 %
10 SYRINGE (ML) INJECTION PRN
Status: DISCONTINUED | OUTPATIENT
Start: 2025-07-22 | End: 2025-07-26 | Stop reason: HOSPADM

## 2025-07-22 RX ORDER — SENNOSIDES 8.6 MG/1
1 TABLET ORAL DAILY PRN
Status: DISCONTINUED | OUTPATIENT
Start: 2025-07-22 | End: 2025-07-26 | Stop reason: HOSPADM

## 2025-07-22 RX ORDER — LIDOCAINE HYDROCHLORIDE 10 MG/ML
INJECTION, SOLUTION EPIDURAL; INFILTRATION; INTRACAUDAL; PERINEURAL
Status: DISCONTINUED | OUTPATIENT
Start: 2025-07-22 | End: 2025-07-22 | Stop reason: SDUPTHER

## 2025-07-22 RX ADMIN — LIDOCAINE HYDROCHLORIDE 20 ML: 10 INJECTION, SOLUTION INFILTRATION; PERINEURAL at 01:37

## 2025-07-22 RX ADMIN — HYDROMORPHONE HYDROCHLORIDE 0.5 MG: 1 INJECTION, SOLUTION INTRAMUSCULAR; INTRAVENOUS; SUBCUTANEOUS at 19:24

## 2025-07-22 RX ADMIN — LIDOCAINE HYDROCHLORIDE 20 ML: 10 INJECTION, SOLUTION INFILTRATION; PERINEURAL at 02:28

## 2025-07-22 RX ADMIN — KETOROLAC TROMETHAMINE 15 MG: 15 INJECTION, SOLUTION INTRAMUSCULAR; INTRAVENOUS at 22:31

## 2025-07-22 RX ADMIN — MIDAZOLAM 2 MG: 1 INJECTION INTRAMUSCULAR; INTRAVENOUS at 13:28

## 2025-07-22 RX ADMIN — HYDROMORPHONE HYDROCHLORIDE 1 MG: 1 INJECTION, SOLUTION INTRAMUSCULAR; INTRAVENOUS; SUBCUTANEOUS at 00:59

## 2025-07-22 RX ADMIN — SUGAMMADEX 210 MG: 100 INJECTION, SOLUTION INTRAVENOUS at 15:12

## 2025-07-22 RX ADMIN — SODIUM CHLORIDE: 0.9 INJECTION, SOLUTION INTRAVENOUS at 03:57

## 2025-07-22 RX ADMIN — KETOROLAC TROMETHAMINE 30 MG: 30 INJECTION, SOLUTION INTRAMUSCULAR at 14:51

## 2025-07-22 RX ADMIN — ACETAMINOPHEN 650 MG: 325 TABLET ORAL at 11:04

## 2025-07-22 RX ADMIN — ACETAMINOPHEN 650 MG: 325 TABLET ORAL at 20:56

## 2025-07-22 RX ADMIN — ROCURONIUM BROMIDE 50 MG: 10 INJECTION, SOLUTION INTRAVENOUS at 13:33

## 2025-07-22 RX ADMIN — FENTANYL CITRATE 50 MCG: 50 INJECTION INTRAMUSCULAR; INTRAVENOUS at 02:20

## 2025-07-22 RX ADMIN — SODIUM CHLORIDE, POTASSIUM CHLORIDE, SODIUM LACTATE AND CALCIUM CHLORIDE: 600; 310; 30; 20 INJECTION, SOLUTION INTRAVENOUS at 13:15

## 2025-07-22 RX ADMIN — FENTANYL CITRATE 100 MCG: 50 INJECTION, SOLUTION INTRAMUSCULAR; INTRAVENOUS at 13:33

## 2025-07-22 RX ADMIN — OXYCODONE 10 MG: 5 TABLET ORAL at 11:04

## 2025-07-22 RX ADMIN — FENTANYL CITRATE 50 MCG: 50 INJECTION INTRAMUSCULAR; INTRAVENOUS at 02:07

## 2025-07-22 RX ADMIN — FENTANYL CITRATE 100 MCG: 50 INJECTION INTRAMUSCULAR; INTRAVENOUS at 13:10

## 2025-07-22 RX ADMIN — ASPIRIN 81 MG: 81 TABLET, CHEWABLE ORAL at 16:59

## 2025-07-22 RX ADMIN — PROPOFOL 200 MG: 10 INJECTION, EMULSION INTRAVENOUS at 13:33

## 2025-07-22 RX ADMIN — METHOCARBAMOL 1000 MG: 100 INJECTION INTRAMUSCULAR; INTRAVENOUS at 03:35

## 2025-07-22 RX ADMIN — OXYCODONE 10 MG: 5 TABLET ORAL at 05:26

## 2025-07-22 RX ADMIN — SODIUM CHLORIDE, PRESERVATIVE FREE 10 ML: 5 INJECTION INTRAVENOUS at 11:05

## 2025-07-22 RX ADMIN — ONDANSETRON 4 MG: 2 INJECTION, SOLUTION INTRAMUSCULAR; INTRAVENOUS at 14:48

## 2025-07-22 RX ADMIN — HYDROMORPHONE HYDROCHLORIDE 0.5 MG: 1 INJECTION, SOLUTION INTRAMUSCULAR; INTRAVENOUS; SUBCUTANEOUS at 15:38

## 2025-07-22 RX ADMIN — CEFAZOLIN 2 G: 1 INJECTION, POWDER, FOR SOLUTION INTRAMUSCULAR; INTRAVENOUS at 13:42

## 2025-07-22 RX ADMIN — OXYCODONE 5 MG: 5 TABLET ORAL at 21:11

## 2025-07-22 RX ADMIN — HYDROMORPHONE HYDROCHLORIDE 0.5 MG: 1 INJECTION, SOLUTION INTRAMUSCULAR; INTRAVENOUS; SUBCUTANEOUS at 09:14

## 2025-07-22 RX ADMIN — Medication 2000 MG: at 21:57

## 2025-07-22 RX ADMIN — LIDOCAINE HYDROCHLORIDE 50 MG: 10 INJECTION, SOLUTION EPIDURAL; INFILTRATION; INTRACAUDAL; PERINEURAL at 13:33

## 2025-07-22 RX ADMIN — OXYCODONE 5 MG: 5 TABLET ORAL at 16:59

## 2025-07-22 RX ADMIN — MIDAZOLAM HYDROCHLORIDE 2 MG: 1 INJECTION, SOLUTION INTRAMUSCULAR; INTRAVENOUS at 13:10

## 2025-07-22 RX ADMIN — SODIUM CHLORIDE, SODIUM LACTATE, POTASSIUM CHLORIDE, AND CALCIUM CHLORIDE: .6; .31; .03; .02 INJECTION, SOLUTION INTRAVENOUS at 17:03

## 2025-07-22 RX ADMIN — HYDROMORPHONE HYDROCHLORIDE 0.5 MG: 1 INJECTION, SOLUTION INTRAMUSCULAR; INTRAVENOUS; SUBCUTANEOUS at 15:57

## 2025-07-22 ASSESSMENT — PAIN DESCRIPTION - ORIENTATION
ORIENTATION: LEFT

## 2025-07-22 ASSESSMENT — ENCOUNTER SYMPTOMS
DIARRHEA: 0
SHORTNESS OF BREATH: 0
ABDOMINAL PAIN: 0
VOMITING: 0
COUGH: 0
NAUSEA: 0
BACK PAIN: 0

## 2025-07-22 ASSESSMENT — PAIN SCALES - GENERAL
PAINLEVEL_OUTOF10: 8
PAINLEVEL_OUTOF10: 7
PAINLEVEL_OUTOF10: 9
PAINLEVEL_OUTOF10: 7
PAINLEVEL_OUTOF10: 7
PAINLEVEL_OUTOF10: 9
PAINLEVEL_OUTOF10: 7
PAINLEVEL_OUTOF10: 10
PAINLEVEL_OUTOF10: 6
PAINLEVEL_OUTOF10: 7
PAINLEVEL_OUTOF10: 7
PAINLEVEL_OUTOF10: 0
PAINLEVEL_OUTOF10: 6
PAINLEVEL_OUTOF10: 8
PAINLEVEL_OUTOF10: 7

## 2025-07-22 ASSESSMENT — PAIN DESCRIPTION - ONSET: ONSET: GRADUAL

## 2025-07-22 ASSESSMENT — PAIN DESCRIPTION - FREQUENCY
FREQUENCY: INTERMITTENT
FREQUENCY: INTERMITTENT

## 2025-07-22 ASSESSMENT — LIFESTYLE VARIABLES
SMOKING_STATUS: 1
HOW OFTEN DO YOU HAVE A DRINK CONTAINING ALCOHOL: NEVER
HOW MANY STANDARD DRINKS CONTAINING ALCOHOL DO YOU HAVE ON A TYPICAL DAY: PATIENT DOES NOT DRINK

## 2025-07-22 ASSESSMENT — PAIN DESCRIPTION - LOCATION
LOCATION: HIP;LEG
LOCATION: LEG
LOCATION: HIP;LEG
LOCATION: HIP;LEG

## 2025-07-22 ASSESSMENT — PAIN DESCRIPTION - PAIN TYPE
TYPE: SURGICAL PAIN

## 2025-07-22 ASSESSMENT — PAIN DESCRIPTION - DESCRIPTORS
DESCRIPTORS: ACHING;TENDER;SORE
DESCRIPTORS: ACHING;TENDER;SORE
DESCRIPTORS: ACHING;TENDER

## 2025-07-22 ASSESSMENT — PAIN - FUNCTIONAL ASSESSMENT
PAIN_FUNCTIONAL_ASSESSMENT: ACTIVITIES ARE NOT PREVENTED
PAIN_FUNCTIONAL_ASSESSMENT: ACTIVITIES ARE NOT PREVENTED

## 2025-07-22 NOTE — OP NOTE
Operative Note    Hernán Mendoza  YOB: 1980  [id number]    Date of surgery: 7/22/2025    Pre-operative Diagnosis: Left peritrochanteric femur fracture    Post-operative Diagnosis: Left peritrochanteric femur fracture    Procedure: Placement intramedullary nail Left Femur  Removal superficial implant left femur    Anesthesia: General    Surgeons: Nikolay Beverly DO    Assistants: Jono Bateman    Estimated Blood Loss: 90 cc    IVF: 900 cc crystalloid    Complications: None    Specimens: Was Not Obtained    Findings: Displaced Left femur fracture    Implants: [employer]hew Metatan 10x46    Indications:   This is a 44 yo Male who presents for operative intervention of their Left peritrochanteric femur fracture. All treatment options, including conservative and operative, were discussed with the patient in detail including the risks and benefits of each. Risks including but not limited to  bleeding, blood clots, infection, damage to nearby tissues, vessels and nerves, wound healing complications, failed procedure, stiffness, loss of motion, malunion, nonunion, anesthesia risk, loss of life were all discussed with the patient.  Knowing these risks, the patient wished to proceed with surgery as indicated. Consent was obtained. Site Marked. All questions answered to the patient's satisfaction.      Operative:    The patient was taken to the operative suite, placed under general anesthesia without any complications.  Ancef 2 gm was given prior to the procedure.  At this time, all team members paused to identify proper patient name, indications, site and allergies.  All team members were in agreeance.  The priorly placed distal femoral skull traction pin was removed without complication after sterilely prepping the pin site.  The patient was placed on a Flat Juan Manuel Table.  Patient was adequately secured to the operative table and all bony prominences were well-padded. The Left lower extremity was prepped

## 2025-07-22 NOTE — ED NOTES
ED to inpatient nurses report      Chief Complaint:  Chief Complaint   Patient presents with    Fall    Leg Injury     femur     Present to ED from: [facility]     MOA:     LOC: alert and orientated to name, place, date  Mobility: Requires assistance * 2  Oxygen Baseline: 0    Current needs required: 0   Pending ED orders: n/a  Present condition: stable     Why did the patient come to the ED? Pt presented to ED transferred from [place].  Pt presents with C/O left leg injury.  Pt states he fell at work and landed on his left side. Per [facility] report the pt has a left femur fx. Pt states pain a 9/10 when he arrived. Prior to arrival pt received 200 mg fent, 2 mg dilaudid, 2 mg versed and 100 mg ketamine.   Pt denies LOC. Pt denies hitting his head. Pt denies blood thinners.  Pt has a hx of arthritis.  What is the plan? OR  Any procedures or intervention occur? N/a  Any safety concerns?? N/a    Mental Status:  Level of Consciousness: Alert (0)    Psych Assessment:   Psychosocial  Psychosocial (WDL): Within Defined Limits  Vital signs   Vitals:    07/22/25 0800 07/22/25 0808 07/22/25 0917 07/22/25 1002   BP: (!) 171/94      Pulse: 76 78 85 70   Resp: 13 11 28 13   Temp:       TempSrc:       SpO2: 98% 98% 98% 96%        Vitals:  Patient Vitals for the past 24 hrs:   BP Temp Temp src Pulse Resp SpO2   07/22/25 1002 -- -- -- 70 13 96 %   07/22/25 0917 -- -- -- 85 28 98 %   07/22/25 0808 -- -- -- 78 11 98 %   07/22/25 0800 (!) 171/94 -- -- 76 13 98 %   07/22/25 0645 (!) 159/100 -- -- 71 13 95 %   07/22/25 0630 (!) 150/93 -- -- 67 12 97 %   07/22/25 0615 (!) 151/89 -- -- 69 14 96 %   07/22/25 0600 (!) 148/97 -- -- 72 15 96 %   07/22/25 0545 (!) 150/97 -- -- 70 11 97 %   07/22/25 0526 -- -- -- -- 15 --   07/22/25 0500 (!) 157/89 -- -- 70 14 98 %   07/22/25 0430 (!) 150/87 -- -- 72 14 95 %   07/22/25 0400 (!) 158/93 -- -- 77 12 95 %   07/22/25 0345 (!) 171/99 -- -- -- -- --   07/22/25 0341 -- -- -- 83 22 98 %   07/22/25 0315 (!)

## 2025-07-22 NOTE — ED NOTES
Pt requesting pain medication for 10/10 LLE pain. Only oral medication ordered and pt's diet is NPO. Perfect serve sent to admitting team request diet change or IV medication.

## 2025-07-22 NOTE — ED TRIAGE NOTES
Pt presented to ED transferred from [place].  Pt presents with C/O left leg injury.  Pt states he fell at work and landed on his left side. Per [facility] report the pt has a left femur fx. Pt states pain a 9/10 when he arrived. Prior to arrival pt received 200 mg fent, 2 mg dilaudid, 2 mg versed and 100 mg ketamine.   Pt denies LOC. Pt denies hitting his head. Pt denies blood thinners.  Pt has a hx of arthritis.  Pt is Alert and oriented. Pt is resting comfortably on stretcher with call light in reach.  No acute distress noted. Respirations are even and unlabored.  White board updated. Will continue to follow plan of care.

## 2025-07-22 NOTE — ED PROVIDER NOTES
[facility] EMERGENCY DEPARTMENT  Emergency Department Encounter  Emergency Medicine Resident     Pt Name:Hernán Mendoza  MRN: [id number]  Birthdate 1980  Date of evaluation: 7/22/25  PCP:  No primary care provider on file.  Note Started: 12:48 AM EDT      CHIEF COMPLAINT       No chief complaint on file.      HISTORY OF PRESENT ILLNESS  (Location/Symptom, Timing/Onset, Context/Setting, Quality, Duration, Modifying Factors, Severity.)      Hernán Mendoza is a 45 y.o. male who presents with ***    PAST MEDICAL / SURGICAL / SOCIAL / FAMILY HISTORY      has a past medical history of Achilles tendinitis and Arthritis.  ***     has a past surgical history that includes Leg Surgery (Right).  ***    Social History     Socioeconomic History   • Marital status: Single     Spouse name: Not on file   • Number of children: Not on file   • Years of education: Not on file   • Highest education level: Not on file   Occupational History   • Not on file   Tobacco Use   • Smoking status: Every Day     Current packs/day: 1.00     Types: Cigarettes   • Smokeless tobacco: Never   Vaping Use   • Vaping status: Never Used   Substance and Sexual Activity   • Alcohol use: Never   • Drug use: Never   • Sexual activity: Not on file   Other Topics Concern   • Not on file   Social History Narrative   • Not on file     Social Drivers of Health     Financial Resource Strain: Not on file   Food Insecurity: Not on file   Transportation Needs: Not on file   Physical Activity: Not on file   Stress: Not on file   Social Connections: Not on file   Intimate Partner Violence: Not on file   Housing Stability: Not on file       No family history on file.    Allergies:  Codeine    Home Medications:  Prior to Admission medications    Medication Sig Start Date End Date Taking? Authorizing Provider   indomethacin (INDOCIN) 50 MG capsule Take 1 capsule by mouth 5/6/25   ProviderDangelo MD   celecoxib (CELEBREX) 200 MG capsule Take 1 capsule by            PATIENT REFERRED TO:  No follow-up provider specified.    DISCHARGE MEDICATIONS:  New Prescriptions    No medications on file       Ayden Rodriguez MD  Emergency Medicine Resident    (Please note that portions of thisnote were completed with a voice recognition program.  Efforts were made to edit the dictations but occasionally words are mis-transcribed.)

## 2025-07-22 NOTE — CONSULTS
Orthopaedic Surgery Consult  (Dr. Beverly)    Time Evaluated: 1:30 AM    CC/Reason for consult: Left proximal femur fracture    HPI:      The patient is a 45 y.o. male who presents as a transfer from [place] for orthopedic surgery care for his left proximal femur fracture, which he sustained around 6 PM yesterday evening.  Orthopedic surgery was consulted upon arrival to the hospital, as patient arrived with radiographs obtained at the outside facility of the left hip, demonstrating a displaced comminuted left peritrochanteric femur fracture.  Patient was seen and evaluated in the emergency department in no acute distress.  He endorsed a mechanism of pulling a pallet used for stocking goods at work lost his footing and fell directly onto his left hip, experiencing immediate pain and inability to ambulate.  Patient was brought to [facility] by EMS where radiographs revealed a fracture.  He was subsequently transferred to [facility] for higher level orthopedic care.  Patient denies pain anywhere else at this time.  Denies prior history of left hip pain.  Patient is fairly active and works a lot on a daily basis.  He denies any numbness or tingling in the left lower extremity.  He ambulates independently at baseline.  Past medical history includes hypertension and arthritis for which he takes NSAIDs.  Patient endorses smoking approximately 1 pack cigarettes per day.  Denies taking blood thinning medications.  Denies history of diabetes.  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      Past Medical History:    Past Medical History:   Diagnosis Date    Achilles tendinitis     Arthritis      Past Surgical History:    Past Surgical History:   Procedure Laterality Date    LEG SURGERY Right      Medications Prior to Admission:   Prior to Admission medications    Medication Sig Start Date End Date Taking? Authorizing Provider   indomethacin (INDOCIN) 50 MG capsule  soft and easily compressible. Full ROM at shoulder, elbow, wrist without pain. Ulnar/median/AIN/PIN/radial motor intact. Axillary/MCN/median/ulnar/radial nerves SILT. Radial pulse 2+ with BCR, fingers are warm and well perfused.    LUE: Skin intact. No ecchymoses, abrasion, deformity, or lacerations. Non tender to palpation. No bony crepitus felt on palpation. Compartments soft and easily compressible. Full ROM at shoulder, elbow, wrist without pain. Ulnar/median/AIN/PIN/radial motor intact. Axillary/MCN/median/ulnar/radial nerves SILT. Radial pulse 2+ with BCR, fingers are warm and well perfused.    RLE: Skin intact.  Healed surgical scars appreciated to the anterior mid tibia.  No ecchymoses, abrasions, deformity, or lacerations. Non tender to palpation. No bony crepitus felt on palpation. Compartments soft and easily compressible. EHL/FHL/TA/GS complex motor intact. Sural/saphenous/SPN/DPN/plantar nerve distribution SILT. Patient has no groin pain with log roll maneuver. Lachman 1a, knee appears stable to varus and valgus stress test at 0 and 30 degrees. DP and PT pulses 2+ with BCR, toes are warm and well perfused.    LLE: Skin intact.  Healed scars to the mid to lower tibia.  Swelling deformity to the proximal femur.  Limb is slightly shortened compared to the contralateral limb.  No ecchymoses, abrasions, or lacerations.  Highly tender to palpation about the left thigh and proximal femur. Compartments full but soft and easily compressible. EHL/FHL/TA/GS complex motor intact. Sural/saphenous/SPN/DPN/plantar nerve distribution SILT. Log roll and ligamentous knee exam deferred due to known proximal femur fracture. DP and PT pulses 2+ with BCR, toes are warm and well perfused.     Labs:  Recent Labs     25   WBC 6.1   HGB 13.9   HCT 41.6      INR 1.0      K 3.4*   BUN 18   CREATININE 1.0   GLUCOSE 101*        Imagin views (AP, Lat) of the left hip and femur demonstrating a comminuted

## 2025-07-22 NOTE — ED NOTES
PT resting on stretcher, alert, RR even and unlabored. PT provided PRN pain medication, pt repositioned. PT updated on plan of care. Call light within reach.

## 2025-07-22 NOTE — ED PROVIDER NOTES
[facility] EMERGENCY DEPARTMENT  Emergency Department Encounter  Emergency Medicine Resident     Pt Name:Hernán Mendoza  MRN: [id number]  Birthdate 1980  Date of evaluation: 7/22/25  PCP:  No primary care provider on file.  Note Started: 12:48 AM EDT      CHIEF COMPLAINT       Chief Complaint   Patient presents with    Fall    Leg Injury     femur       HISTORY OF PRESENT ILLNESS  (Location/Symptom, Timing/Onset, Context/Setting, Quality, Duration, Modifying Factors, Severity.)      Hernán Mendoza is a 45 y.o. male who presents with left hip pain as a transfer from outside facility.  Patient states he was attempting to move a pallet that was loaded when the securement fabric came loose and he fell onto his left hip.  He denies impact to the head, neck, back, LOC, use of blood thinners.  He notes his only other medical condition is gout.  ED EMS states that patient received 2 x 100 mcg doses of fentanyl as well as 2 x 1 mg Dilaudid.  During transport patient received 2 mg Versed for muscle spasms as well as 20 mg ketamine for sub dissociative dose of pain control.  Patient states this was most effective for him.  Per EMS PMS intact but they have concern for shortening.    PAST MEDICAL / SURGICAL / SOCIAL / FAMILY HISTORY      has a past medical history of Achilles tendinitis and Arthritis.       has a past surgical history that includes Leg Surgery (Right).      Social History     Socioeconomic History    Marital status: Single     Spouse name: Not on file    Number of children: Not on file    Years of education: Not on file    Highest education level: Not on file   Occupational History    Not on file   Tobacco Use    Smoking status: Every Day     Current packs/day: 1.00     Types: Cigarettes    Smokeless tobacco: Never   Vaping Use    Vaping status: Never Used   Substance and Sexual Activity    Alcohol use: Never    Drug use: Never    Sexual activity: Not on file   Other Topics Concern    Not on file

## 2025-07-22 NOTE — PROGRESS NOTES
Physical Therapy Cancel Note      DATE: 2025    NAME: Hernán Mendoza  MRN: [id number]   : 1980      Patient not seen this date for Physical Therapy due to:    Surgery/Procedure: OR with ortho today. PT will check back post-procedure for any further needs.       Electronically signed by Estella Morton SPT on 2025 at 11:18 AM   Evaluation/treatment performed by Student PT under the supervision of co-signing PT who agrees with all evaluation/treatment and documentation.

## 2025-07-22 NOTE — ED NOTES
Patient report called to: [facility] ED  Report given to: Jaida LAKE  Phone number used: [phone]

## 2025-07-22 NOTE — PLAN OF CARE
Problem: Discharge Planning  Goal: Discharge to home or other facility with appropriate resources  7/22/2025 1838 by Alecia Encarnacion RN  Outcome: Progressing     Problem: Safety - Adult  Goal: Free from fall injury  7/22/2025 1838 by Alecia Encarnacion RN  Outcome: Progressing     Problem: Pain  Goal: Verbalizes/displays adequate comfort level or baseline comfort level  7/22/2025 1838 by Alecia Encarnacion RN  Outcome: Progressing     Problem: Skin/Tissue Integrity  Goal: Skin integrity remains intact  Description: 1.  Monitor for areas of redness and/or skin breakdown  2.  Assess vascular access sites hourly  3.  Every 4-6 hours minimum:  Change oxygen saturation probe site  4.  Every 4-6 hours:  If on nasal continuous positive airway pressure, respiratory therapy assess nares and determine need for appliance change or resting period  7/22/2025 1838 by Alecia Encarnacion RN  Outcome: Progressing     Problem: ABCDS Injury Assessment  Goal: Absence of physical injury  Outcome: Progressing

## 2025-07-22 NOTE — ED PROVIDER NOTES
[facility]   Emergency Department  Faculty Attestation       I performed a history and physical examination of the patient and discussed management with the resident. I reviewed the resident’s note and agree with the documented findings including all diagnostic interpretations and plan of care. Any areas of disagreement are noted on the chart. I was personally present for the key portions of any procedures. I have documented in the chart those procedures where I was not present during the key portions. I have reviewed the emergency nurses triage note. I agree with the chief complaint, past medical history, past surgical history, allergies, medications, social and family history as documented unless otherwise noted below.  For Physician Assistant/ Nurse Practitioner cases/documentation I have personally evaluated this patient and have completed at least one if not all key elements of the E/M (history, physical exam, and MDM). Additional findings are as noted.    Patient Name: Hernán Mendoza  MRN: [id number]  : 1980  Primary Care Physician: No primary care provider on file.    Date of evaluationa: 2025   Note Started: 6:47 AM EDT    Pertinent Comments     Chief Complaint:   Chief Complaint   Patient presents with    Fall    Leg Injury     femur        Initial vitals: (If not listed, please see nursing documentation)  ED Triage Vitals   BP Systolic BP Percentile Diastolic BP Percentile Temp Temp Source Pulse Respirations SpO2   25 0049 -- -- 25 0150 25 0150 25 0049 25 0049 25 0049   (!) 159/107   98.2 °F (36.8 °C) Oral 82 11 98 %      Height Weight         -- --                        HPI/PE/Impression:  This is a 45 y.o. male who presents to the Emergency Department transfer from outlying facility due to femur fracture.  Patient was moving a pallet, and slipped and left on his left side.  Has femur fracture.  Had received Dilaudid, fentanyl, ketamine in

## 2025-07-22 NOTE — ANESTHESIA PRE PROCEDURE
Department of Anesthesiology  Preprocedure Note       Name:  Hernán Mendoza   Age:  45 y.o.  :  1980                                          MRN:  [id number]         Date:  2025      Surgeon: Surgeon(s):  Nikolay Beverly DO    Procedure: Procedure(s):  INTRAMEDULLARY NAIL FEMUR FRACTURE (FLAT WILLIAN, SUP, NAKUL, BONE FOAM, LILLY AND NEPHEW MEDATAN NAIL)    Medications prior to admission:   Prior to Admission medications    Medication Sig Start Date End Date Taking? Authorizing Provider   indomethacin (INDOCIN) 50 MG capsule Take 1 capsule by mouth 25   Dangelo Noble MD   celecoxib (CELEBREX) 200 MG capsule Take 1 capsule by mouth 2 times daily  Patient not taking: Reported on 2025    Dangelo Noble MD   gabapentin (NEURONTIN) 300 MG capsule 1 tab PO x1 day, then 1 tab PO BID x1 day, then 1 tab PO TID thereafter 7/2/23 7/10/23  Charles Huff MD   naproxen (NAPROSYN) 375 MG tablet Take 1 tablet by mouth 2 times daily (with meals)  Patient not taking: Reported on 2025   Mary Galvan MD   ibuprofen (ADVIL;MOTRIN) 800 MG tablet Take 1 tablet by mouth every 8 hours as needed for Pain  Patient not taking: Reported on 2025   hCarles Huff MD       Current medications:    Current Facility-Administered Medications   Medication Dose Route Frequency Provider Last Rate Last Admin   • [Transfer Hold] ceFAZolin (ANCEF) 2000 mg in sterile water 20 mL IV syringe  2,000 mg IntraVENous On Call to OR Lenny Dela Cruz DO       • [Transfer Hold] ketamine (KETALAR) IntraVENous 25 mg  0.25 mg/kg IntraVENous Once Ayden Rodriguez MD       • [Transfer Hold] sodium chloride flush 0.9 % injection 10 mL  10 mL IntraVENous 2 times per day Lenny Dela Cruz DO   10 mL at 25 1105   • [Transfer Hold] sodium chloride flush 0.9 % injection 10 mL  10 mL IntraVENous PRN Lenny Dela Cruz DO       • [Transfer Hold] 0.9 % sodium chloride infusion   IntraVENous PRN

## 2025-07-22 NOTE — BRIEF OP NOTE
Brief Postoperative Note      Patient: Hernán Mendoza  YOB: 1980  MRN: [id number]    Date of Procedure: 7/22/2025    Pre-Op Diagnosis Codes:   - Left intertrochanteric femur fracture    Post-Op Diagnosis:  - Left intertrochanteric femur fracture        Procedure(s):  - Removal of skeletal traction to the left lower extremity  - Left femur intramedullary nailing     Surgeon(s):  Nikolay Beverly DO    Assistant:  Resident: Deidre Bateman DO; Leo De La Cruz MD    Anesthesia: General    Estimated Blood Loss (mL): 90  Fluids (mL): 900    Complications: None    Specimens:   * No specimens in log *    Implants:  Implant Name Type Inv. Item Serial No.  Lot No. LRB No. Used Action   NAIL IM L46CM [id number] LIME META-TAN - [id number]  NAIL IM L46CM [id number] LT LIME META-TAN  [employer]WD [id number] Left 1 Implanted         Drains: * No LDAs found *    Findings:  Present At Time Of Surgery (PATOS) (choose all levels that have infection present):  No infection present  Other Findings: Left intertrochanteric femur fracture, see op note     Electronically signed by Deidre Bateman DO on 7/22/2025 at 2:29 PM

## 2025-07-22 NOTE — PLAN OF CARE
Patient:  Hernán Mendoza  YOB: 1980     45 y.o. male    Orthopedic Instructions    Impression:  Hernán Mendoza is a 45 y.o. male who sustained a mechanical fall from standing height and is being seen for:    - Left intertrochanteric femur fracture    Procedure(s), DOS: 07/22/2025 with Dr. Beverly  - Left femur intramedullary nailing    Plan:  - No further orthopaedic surgical intervention planned at this time.   - Optifoams on LLE. Please maintain and keep clean and dry. Reinforce dressings as needed per nursing.  - WBAT  to the LLE.  - Complete post-op antibiotics: Ancef 2g q8h x2 doses.  - Diet: Ok for Adult diet from ortho perspective.  - DVT ppx: Okay to start chemical anticoagulation POD#1.    - PT/OT evaluation post-op.  - Multimodal pain control ordered.  - Ice and elevate extremity for pain and swelling.  - Ok to discharge home after completion of post-op antibiotics and evaluation by PT/OT.   - Follow up with Dr. Beverly on 8/6/2025 at 8:30 AM.  - Please contact Ortho on call with any questions.      Deidre Bateman,   Orthopedic Surgery, PGY-1  [place], [place]

## 2025-07-22 NOTE — ED NOTES
45 yom with carrying something with fall.   Does have a proximal femur fracture.   Neurovascularly intact distally.   There orthopedic surgeon believe this needed a trauma orthopedic surgeon and was transferred.   Coming by ground

## 2025-07-22 NOTE — H&P
Orthopaedic Surgery Consult  (Dr. Beverly)    Time Evaluated: 1:30 AM    CC/Reason for consult: Left proximal femur fracture    HPI:      The patient is a 45 y.o. male who presents as a transfer from [place] for orthopedic surgery care for his left proximal femur fracture, which he sustained around 6 PM yesterday evening.  Orthopedic surgery was consulted upon arrival to the hospital, as patient arrived with radiographs obtained at the outside facility of the left hip, demonstrating a displaced comminuted left peritrochanteric femur fracture.  Patient was seen and evaluated in the emergency department in no acute distress.  He endorsed a mechanism of pulling a pallet used for stocking goods at work lost his footing and fell directly onto his left hip, experiencing immediate pain and inability to ambulate.  Patient was brought to [facility] by EMS where radiographs revealed a fracture.  He was subsequently transferred to [facility] for higher level orthopedic care.  Patient denies pain anywhere else at this time.  Denies prior history of left hip pain.  Patient is fairly active and works a lot on a daily basis.  He denies any numbness or tingling in the left lower extremity.  He ambulates independently at baseline.  Past medical history includes hypertension and arthritis for which he takes NSAIDs.  Patient endorses smoking approximately 1 pack cigarettes per day.  Denies taking blood thinning medications.  Denies history of diabetes.  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      Past Medical History:    Past Medical History:   Diagnosis Date    Achilles tendinitis     Arthritis      Past Surgical History:    Past Surgical History:   Procedure Laterality Date    LEG SURGERY Right      Medications Prior to Admission:   Prior to Admission medications    Medication Sig Start Date End Date Taking? Authorizing Provider   indomethacin (INDOCIN) 50 MG capsule

## 2025-07-22 NOTE — ED NOTES
ED to inpatient nurses report      Chief Complaint:  Chief Complaint   Patient presents with    Fall    Leg Injury     femur     Present to ED from: transfer from [place]    MOA:     LOC: alert and orientated to name, place, date  Mobility: Requires assistance * 2  Oxygen Baseline: room air    Current needs required: none  Pending ED orders: medications  Present condition: stable    Why did the patient come to the ED? Pt presented to ED transferred from [place].  Pt presents with C/O left leg injury.  Pt states he fell at work and landed on his left side. Per [place] report the pt has a left femur fx. Pt states pain a 9/10 when he arrived. Prior to arrival pt received 200 mg fent, 2 mg dilaudid, 2 mg versed and 100 mg ketamine.   Pt denies LOC. Pt denies hitting his head. Pt denies blood thinners.  Pt has a hx of arthritis. Pt A&Ox4  What is the plan? Admit, surgery  Any procedures or intervention occur? X-ray, ortho at bedside  Any safety concerns?? Fall risk    Mental Status:  Level of Consciousness: Alert (0)    Psych Assessment:      Vital signs   Vitals:    07/22/25 0049 07/22/25 0150   BP: (!) 159/107    Pulse: 82    Resp: 11    Temp:  98.2 °F (36.8 °C)   TempSrc:  Oral   SpO2: 98%         Vitals:  Patient Vitals for the past 24 hrs:   BP Temp Temp src Pulse Resp SpO2   07/22/25 0150 -- 98.2 °F (36.8 °C) Oral -- -- --   07/22/25 0049 (!) 159/107 -- -- 82 11 98 %      Visit Vitals  BP (!) 159/107   Pulse 82   Temp 98.2 °F (36.8 °C) (Oral)   Resp 11   SpO2 98%        LDAs:   Peripheral IV 07/21/25 Right Antecubital (Active)       Ambulatory Status:  Presents to emergency department  because of falls (Syncope, seizure, or loss of consciousness): Yes, Age > 70: No, Altered Mental Status, Intoxication with alcohol or substance confusion (Disorientation, impaired judgment, poor safety awaremess, or inability to follow instructions): No, Impaired Mobility: Ambulates or transfers with assistive devices or assistance;

## 2025-07-22 NOTE — DISCHARGE INSTRUCTIONS
Orthopedic Instructions:  - Weight bearing status: Weight bearing as tolerated for the left leg  - Keep dressing clean and dry.  - Starting 3 days after surgery, Ok for daily dressing changes until wound is dry. Then leave open to air.  - Starting 3 days after surgery, Ok to shower but no soaks in a bath, hot tub, pool, etc    - Always work on motion (to non-injured toes) to decrease swelling.  - Ice (20 minutes on and off 1 hour) and elevate above the level of the heart to reduce swelling and throbbing pain.  - Drink plenty of fluids.  - Should urinate within 8 hours of surgery.    - Always look for signs of compartment syndrome: pain out of proportion to the injury, pain not controlled with pain medication, numbness in digits, changing of color of digits (paleness). If these signs occur return to ED immediately for reassessment.   - Call the office or come to Emergency Room if signs of infection appear (hot, swollen, red, draining pus, fever)  - Take medications as prescribed.  - Wean off narcotics (percocet/norco) as soon as possible. Do not take tylenol if still taking narcotics.  - Follow up with Dr. Beverly in their office 8/6/2025 8:30 AM after surgery. Call [phone] to schedule/confirm.

## 2025-07-23 LAB
ANION GAP SERPL CALCULATED.3IONS-SCNC: 11 MMOL/L (ref 9–16)
BASOPHILS # BLD: 0.03 K/UL (ref 0–0.2)
BASOPHILS NFR BLD: 1 % (ref 0–2)
BUN SERPL-MCNC: 10 MG/DL (ref 6–20)
CALCIUM SERPL-MCNC: 8.2 MG/DL (ref 8.6–10.4)
CHLORIDE SERPL-SCNC: 99 MMOL/L (ref 98–107)
CO2 SERPL-SCNC: 24 MMOL/L (ref 20–31)
CREAT SERPL-MCNC: 0.8 MG/DL (ref 0.7–1.2)
EKG ATRIAL RATE: 73 BPM
EKG P AXIS: 20 DEGREES
EKG P-R INTERVAL: 136 MS
EKG Q-T INTERVAL: 342 MS
EKG QRS DURATION: 90 MS
EKG QTC CALCULATION (BAZETT): 376 MS
EKG R AXIS: -59 DEGREES
EKG T AXIS: 15 DEGREES
EKG VENTRICULAR RATE: 73 BPM
EOSINOPHIL # BLD: 0.12 K/UL (ref 0–0.44)
EOSINOPHILS RELATIVE PERCENT: 2 % (ref 1–4)
ERYTHROCYTE [DISTWIDTH] IN BLOOD BY AUTOMATED COUNT: 12.2 % (ref 11.8–14.4)
GFR, ESTIMATED: >90 ML/MIN/1.73M2
GLUCOSE SERPL-MCNC: 103 MG/DL (ref 74–99)
HCT VFR BLD AUTO: 29.2 % (ref 40.7–50.3)
HGB BLD-MCNC: 9.8 G/DL (ref 13–17)
IMM GRANULOCYTES # BLD AUTO: <0.03 K/UL (ref 0–0.3)
IMM GRANULOCYTES NFR BLD: 0 %
LYMPHOCYTES NFR BLD: 1 K/UL (ref 1.1–3.7)
LYMPHOCYTES RELATIVE PERCENT: 18 % (ref 24–43)
MAGNESIUM SERPL-MCNC: 1.6 MG/DL (ref 1.6–2.6)
MCH RBC QN AUTO: 32 PG (ref 25.2–33.5)
MCHC RBC AUTO-ENTMCNC: 33.6 G/DL (ref 28.4–34.8)
MCV RBC AUTO: 95.4 FL (ref 82.6–102.9)
MONOCYTES NFR BLD: 0.58 K/UL (ref 0.1–1.2)
MONOCYTES NFR BLD: 11 % (ref 3–12)
NEUTROPHILS NFR BLD: 68 % (ref 36–65)
NEUTS SEG NFR BLD: 3.7 K/UL (ref 1.5–8.1)
NRBC BLD-RTO: 0 PER 100 WBC
PLATELET # BLD AUTO: 171 K/UL (ref 138–453)
PMV BLD AUTO: 10.3 FL (ref 8.1–13.5)
POTASSIUM SERPL-SCNC: 3.4 MMOL/L (ref 3.7–5.3)
RBC # BLD AUTO: 3.06 M/UL (ref 4.21–5.77)
SODIUM SERPL-SCNC: 134 MMOL/L (ref 136–145)
WBC OTHER # BLD: 5.4 K/UL (ref 3.5–11.3)

## 2025-07-23 PROCEDURE — 97162 PT EVAL MOD COMPLEX 30 MIN: CPT

## 2025-07-23 PROCEDURE — 2500000003 HC RX 250 WO HCPCS

## 2025-07-23 PROCEDURE — 97530 THERAPEUTIC ACTIVITIES: CPT

## 2025-07-23 PROCEDURE — 83735 ASSAY OF MAGNESIUM: CPT

## 2025-07-23 PROCEDURE — 6370000000 HC RX 637 (ALT 250 FOR IP)

## 2025-07-23 PROCEDURE — 1200000000 HC SEMI PRIVATE

## 2025-07-23 PROCEDURE — 36415 COLL VENOUS BLD VENIPUNCTURE: CPT

## 2025-07-23 PROCEDURE — 97166 OT EVAL MOD COMPLEX 45 MIN: CPT

## 2025-07-23 PROCEDURE — 85025 COMPLETE CBC W/AUTO DIFF WBC: CPT

## 2025-07-23 PROCEDURE — 80048 BASIC METABOLIC PNL TOTAL CA: CPT

## 2025-07-23 PROCEDURE — 6360000002 HC RX W HCPCS

## 2025-07-23 PROCEDURE — 93010 ELECTROCARDIOGRAM REPORT: CPT | Performed by: INTERNAL MEDICINE

## 2025-07-23 PROCEDURE — 94761 N-INVAS EAR/PLS OXIMETRY MLT: CPT

## 2025-07-23 PROCEDURE — 2580000003 HC RX 258

## 2025-07-23 RX ORDER — OXYCODONE HYDROCHLORIDE 5 MG/1
5 TABLET ORAL EVERY 4 HOURS PRN
Refills: 0 | Status: DISCONTINUED | OUTPATIENT
Start: 2025-07-23 | End: 2025-07-26 | Stop reason: HOSPADM

## 2025-07-23 RX ORDER — CYCLOBENZAPRINE HCL 10 MG
7.5 TABLET ORAL EVERY 6 HOURS
Status: DISCONTINUED | OUTPATIENT
Start: 2025-07-23 | End: 2025-07-23

## 2025-07-23 RX ORDER — OXYCODONE HYDROCHLORIDE 10 MG/1
10 TABLET ORAL EVERY 4 HOURS PRN
Refills: 0 | Status: DISCONTINUED | OUTPATIENT
Start: 2025-07-23 | End: 2025-07-26 | Stop reason: HOSPADM

## 2025-07-23 RX ORDER — NAPROXEN 250 MG/1
500 TABLET ORAL 2 TIMES DAILY WITH MEALS
Status: DISCONTINUED | OUTPATIENT
Start: 2025-07-23 | End: 2025-07-26 | Stop reason: HOSPADM

## 2025-07-23 RX ORDER — POTASSIUM CHLORIDE 1500 MG/1
40 TABLET, EXTENDED RELEASE ORAL PRN
Status: DISCONTINUED | OUTPATIENT
Start: 2025-07-23 | End: 2025-07-26 | Stop reason: HOSPADM

## 2025-07-23 RX ORDER — POTASSIUM CHLORIDE 7.45 MG/ML
10 INJECTION INTRAVENOUS PRN
Status: DISCONTINUED | OUTPATIENT
Start: 2025-07-23 | End: 2025-07-26 | Stop reason: HOSPADM

## 2025-07-23 RX ORDER — CYCLOBENZAPRINE HCL 10 MG
5 TABLET ORAL EVERY 6 HOURS
Status: DISCONTINUED | OUTPATIENT
Start: 2025-07-24 | End: 2025-07-26 | Stop reason: HOSPADM

## 2025-07-23 RX ADMIN — OXYCODONE 5 MG: 5 TABLET ORAL at 01:17

## 2025-07-23 RX ADMIN — ACETAMINOPHEN 650 MG: 325 TABLET ORAL at 04:17

## 2025-07-23 RX ADMIN — Medication 2000 MG: at 05:49

## 2025-07-23 RX ADMIN — CYCLOBENZAPRINE 7.5 MG: 10 TABLET, FILM COATED ORAL at 09:33

## 2025-07-23 RX ADMIN — OXYCODONE HYDROCHLORIDE 10 MG: 10 TABLET ORAL at 13:30

## 2025-07-23 RX ADMIN — CYCLOBENZAPRINE 7.5 MG: 10 TABLET, FILM COATED ORAL at 20:04

## 2025-07-23 RX ADMIN — NAPROXEN 500 MG: 250 TABLET ORAL at 09:38

## 2025-07-23 RX ADMIN — SODIUM CHLORIDE, PRESERVATIVE FREE 10 ML: 5 INJECTION INTRAVENOUS at 09:33

## 2025-07-23 RX ADMIN — OXYCODONE HYDROCHLORIDE 10 MG: 10 TABLET ORAL at 09:33

## 2025-07-23 RX ADMIN — ASPIRIN 81 MG: 81 TABLET, COATED ORAL at 09:32

## 2025-07-23 RX ADMIN — POTASSIUM CHLORIDE 40 MEQ: 1500 TABLET, EXTENDED RELEASE ORAL at 18:27

## 2025-07-23 RX ADMIN — OXYCODONE HYDROCHLORIDE 10 MG: 10 TABLET ORAL at 17:46

## 2025-07-23 RX ADMIN — HYDROMORPHONE HYDROCHLORIDE 0.5 MG: 1 INJECTION, SOLUTION INTRAMUSCULAR; INTRAVENOUS; SUBCUTANEOUS at 04:18

## 2025-07-23 RX ADMIN — CYCLOBENZAPRINE 7.5 MG: 10 TABLET, FILM COATED ORAL at 15:11

## 2025-07-23 RX ADMIN — OXYCODONE 5 MG: 5 TABLET ORAL at 05:57

## 2025-07-23 RX ADMIN — NAPROXEN 500 MG: 250 TABLET ORAL at 17:03

## 2025-07-23 RX ADMIN — ASPIRIN 81 MG: 81 TABLET, COATED ORAL at 20:34

## 2025-07-23 RX ADMIN — ACETAMINOPHEN 650 MG: 325 TABLET ORAL at 15:11

## 2025-07-23 RX ADMIN — SODIUM CHLORIDE, PRESERVATIVE FREE 10 ML: 5 INJECTION INTRAVENOUS at 20:36

## 2025-07-23 RX ADMIN — SODIUM CHLORIDE, SODIUM LACTATE, POTASSIUM CHLORIDE, AND CALCIUM CHLORIDE: .6; .31; .03; .02 INJECTION, SOLUTION INTRAVENOUS at 02:47

## 2025-07-23 RX ADMIN — ACETAMINOPHEN 650 MG: 325 TABLET ORAL at 20:34

## 2025-07-23 RX ADMIN — OXYCODONE 5 MG: 5 TABLET ORAL at 22:19

## 2025-07-23 RX ADMIN — DOCUSATE SODIUM 100 MG: 100 CAPSULE, LIQUID FILLED ORAL at 09:38

## 2025-07-23 RX ADMIN — ACETAMINOPHEN 650 MG: 325 TABLET ORAL at 09:31

## 2025-07-23 ASSESSMENT — PAIN SCALES - GENERAL
PAINLEVEL_OUTOF10: 6
PAINLEVEL_OUTOF10: 6
PAINLEVEL_OUTOF10: 7
PAINLEVEL_OUTOF10: 6
PAINLEVEL_OUTOF10: 7
PAINLEVEL_OUTOF10: 5
PAINLEVEL_OUTOF10: 6
PAINLEVEL_OUTOF10: 8
PAINLEVEL_OUTOF10: 7
PAINLEVEL_OUTOF10: 7
PAINLEVEL_OUTOF10: 6
PAINLEVEL_OUTOF10: 6
PAINLEVEL_OUTOF10: 8
PAINLEVEL_OUTOF10: 5
PAINLEVEL_OUTOF10: 7

## 2025-07-23 ASSESSMENT — PAIN DESCRIPTION - ORIENTATION
ORIENTATION: LEFT

## 2025-07-23 ASSESSMENT — PAIN DESCRIPTION - DESCRIPTORS
DESCRIPTORS: DISCOMFORT
DESCRIPTORS: DISCOMFORT
DESCRIPTORS: SORE;DISCOMFORT

## 2025-07-23 ASSESSMENT — PAIN DESCRIPTION - LOCATION
LOCATION: HIP;LEG
LOCATION: LEG
LOCATION: LEG;BUTTOCKS

## 2025-07-23 ASSESSMENT — LIFESTYLE VARIABLES
HOW MANY STANDARD DRINKS CONTAINING ALCOHOL DO YOU HAVE ON A TYPICAL DAY: 1 OR 2
HOW OFTEN DO YOU HAVE A DRINK CONTAINING ALCOHOL: 2-4 TIMES A MONTH

## 2025-07-23 NOTE — PROGRESS NOTES
Occupational Therapy  Occupational Therapy Initial Evaluation  Facility/Department: [address] ORTHO/MED SURG   Patient Name: Hernán Mendoza        MRN: [id number]    : 1980    Date of Service: 2025    Chief Complaint   Patient presents with    Fall    Leg Injury     femur     Past Medical History:  has a past medical history of Achilles tendinitis and Arthritis.  Past Surgical History:  has a past surgical history that includes Leg Surgery (Right) and Femur Sindy nail insertion (Left, 2025).    Discharge Recommendations  Discharge Recommendations: Patient would benefit from continued therapy after discharge  Further therapy recommended at discharge.The patient should be able to tolerate at least 3 hours of therapy per day over 5 days or 15 hours over 7 days.   This patient may benefit from a Physical Medicine and Rehab consult.    OT Equipment Recommendations  Equipment Needed: Yes  Mobility Devices: Walker;ADL Assistive Devices  Walker: Rolling  ADL Assistive Devices: Sock-Aid Hard;Reacher;Long-handled Sponge;Transfer Tub Bench    Assessment  Performance deficits / Impairments: Decreased functional mobility ;Decreased balance;Decreased ADL status;Decreased endurance;Decreased high-level IADLs;Decreased strength  Assessment: Pt agreed to occupational therapy evaluation with education provided on purpose and role of occupational therapy services in the acute care setting. OT facilitated assessing functional mobility and ADL performance to pt's tolerance this visit. Pt exhibited requiring modAx2 for bed mobility, modAx2 for functional sit<>stand transfers and modAx2 for functional mobility. Transfers/mobility completed utilizing RW. OT facilitated LB dressing and pt required TA to complete, see below for more details. Based on the patient's performance throughout this evaluation and the above listed deficits, it is expected they are to require skilled OT services during their acute hospitilization to

## 2025-07-23 NOTE — CARE COORDINATION
SW met with patient to complete SBIRT assessment. Patient was sitting in chair, alert and oriented x4.  Patient stated he was independent prior to admission and he and his nephew live together.      Patient denied any feelings of depression, suicidal or homicidal thoughts.  Patient denied any drug use and admitted to drinking 1- 24 oz \"tall boy\" 1x/monthly.  Patient declined concerns with his alcohol use.      Patient denied any Social Service needs at this time.            Alcohol Screening and Brief Intervention        No results for input(s): \"ALC\" in the last 72 hours.    Alcohol Pre-screening  (MEN ONLY) How many times in the past year have you had 5 or more drinks in a day?: None          Drug Pre-Screening   0    Drug Screening DAST  0    Mood Pre-Screening (PHQ-2)  During the past 2 weeks, have you been bothered by, feeling down, depressed or hopeless?  No        I have interviewed Hernán Mendoza, [id number] regarding  His alcohol consumption/drug use and risk for excessive use. Screenings were negative.  Patient  N/A intervention at this time. Please see social work note regarding intervention.    Deferred []    Completed on: 7/23/2025   AINSLEY Ash

## 2025-07-23 NOTE — PROGRESS NOTES
CLINICAL PHARMACY NOTE: MEDS TO BEDS    Total # of Prescriptions Filled: 4   The following medications were delivered to the patient:  Colace 100mg  Aspirin 81mg  Cyclobenzaprine 10mg  Percocet 5-325mg    Additional Documentation: delivered to patient in room 234 7/23 at 12:16pm. No co-pay.

## 2025-07-23 NOTE — CARE COORDINATION
Met w/ patient and SNF choices made: 1) [facility] 2) [facility] 3) [facility] [place] and referrals sent.

## 2025-07-23 NOTE — PROGRESS NOTES
Physical Therapy  Facility/Department: [address] ORTHO/MED SURG   Physical Therapy Initial Evaluation    Patient Name: Hernán Mendoza        MRN: [id number]    : 1980    Date of Service: 2025    Chief Complaint   Patient presents with    Fall    Leg Injury     femur     Past Medical History:  has a past medical history of Achilles tendinitis and Arthritis.  Past Surgical History:  has a past surgical history that includes Leg Surgery (Right) and Femur Sindy nail insertion (Left, 2025).    Discharge Recommendations  Discharge Recommendations: Patient would benefit from continued therapy after discharge  PT Equipment Recommendations  Equipment Needed: Yes  Mobility Devices: Walker  Walker: Rolling    Assessment  Body Structures, Functions, Activity Limitations Requiring Skilled Therapeutic Intervention: Decreased functional mobility , Decreased strength, Increased pain, Decreased balance, Decreased endurance  Assessment: The pt ambulated 10 ft with a RW x mod assistr with WBAT L LE. He reported increased pain in his L LE with mobilization. He moved slowly throughout the eval process.  He could benefit from a continuation of PT for gait , strengthening and functional mobility prior to his DC  Therapy Prognosis: Good  Decision Making: Medium Complexity  Requires PT Follow-Up: Yes  Activity Tolerance  Activity Tolerance: Patient limited by fatigue, Patient limited by pain, Patient limited by endurance  Safety Devices  Type of Devices: Nurse notified, Gait belt, Left in chair, Chair alarm in place, Call light within reach    AM-PAC  AM-PAC Basic Mobility - Inpatient   How much help is needed turning from your back to your side while in a flat bed without using bedrails?: A Little  How much help is needed moving from lying on your back to sitting on the side of a flat bed without using bedrails?: A Little  How much help is needed moving to and from a bed to a chair?: A Lot  How much help is needed standing up  steps with SBA  Short Term Goal 4: 20 min strengthening exercises x SBA    Minutes  PT Individual Minutes  Time In: 0950  Time Out: 1022  Minutes: 32  Co- treatment with OT warranted secondary to decreased patient safety and independence with functional mobility requiring skilled physical assistance of two professionals to simultaneously address individualized discipline goals. PT is addressing transfers,sitting and standing balance, while OT is addressing their individualized functional mobility/self-care task.     Electronically signed by Nino Chavez PT on 7/23/25 at 10:46 AM EDT

## 2025-07-23 NOTE — PLAN OF CARE
Problem: Discharge Planning  Goal: Discharge to home or other facility with appropriate resources  7/23/2025 1523 by Steffi Alexandra, RN  Outcome: Progressing     Problem: Safety - Adult  Goal: Free from fall injury  7/23/2025 1523 by Steffi Alexandra, RN  Outcome: Progressing     Problem: Pain  Goal: Verbalizes/displays adequate comfort level or baseline comfort level  7/23/2025 1523 by Steffi Alexandra, RN  Outcome: Progressing

## 2025-07-23 NOTE — DISCHARGE INSTR - COC
Continuity of Care Form    Patient Name: Hernán Mendoza   :  1980  MRN:  [id number]    Admit date:  2025  Discharge date:  2025    Code Status Order: Full Code   Advance Directives:    Date/Time Healthcare Directive Type of Healthcare Directive Copy in Chart Healthcare Agent Appointed Healthcare Agent's Name Healthcare Agent's Phone Number    [phone] No, patient does not have an advance directive for healthcare treatment  --  --  --  --  --             Admitting Physician:  Nikolay Beverly DO  PCP: No primary care provider on file.    Discharging Nurse:   Discharging Hospital Unit/Room#: 0234/0234-01  Discharging Unit Phone Number: [phone]    Emergency Contact:   Extended Emergency Contact Information  Primary Emergency Contact: Karie Gaona  Mobile Phone: [phone]  Relation: Parent   needed? No    Past Surgical History:  Past Surgical History:   Procedure Laterality Date    FEMUR MARIO NAIL INSERTION Left 2025    REMOVAL OF SKELETAL TRACTION. INTRAMEDULLARY NAIL INSERTION OF FEMUR FRACTURE (Left: Leg Upper)    FEMUR SURGERY Left 2025    REMOVAL OF SKELETAL TRACTION. INTRAMEDULLARY NAIL INSERTION OF FEMUR FRACTURE performed by Nikolay Beverly DO at [facility] OR    LEG SURGERY Right        Immunization History:     There is no immunization history on file for this patient.    Active Problems:  Patient Active Problem List   Diagnosis Code    Closed left subtrochanteric femur fracture, initial encounter ([facility]) S72.22XA    Intertrochanteric fracture of left femur, closed, initial encounter ([facility]) S72.142A       Isolation/Infection:   Isolation            No Isolation          Patient Infection Status    None to display         Nurse Assessment:  Last Vital Signs: /71   Pulse 98   Temp 99 °F (37.2 °C) (Oral)   Resp 20   Ht 1.854 m (6' 1\") Comment: stated  Wt 102.1 kg (225 lb) Comment: stated; pt in traction  SpO2 94%   BMI 29.69 kg/m²     Last documented  (for information only, NOT a DME order):  wheelchair and walker  Other Treatments: none    Patient's personal belongings (please select all that are sent with patient):  None    RN SIGNATURE:  Electronically signed by Gifty Santoyo RN on 7/25/25 at 4:36 PM EDT    CASE MANAGEMENT/SOCIAL WORK SECTION    Inpatient Status Date: 722-25    Readmission Risk Assessment Score:  [facility] RISK OF UNPLANNED READMISSION 2.0             9.8 Total Score        Discharging to Facility/ Agency   Name: [facility]  Address:[address] [place] [id number]  Phone:[phone]  Fax: [phone]    Dialysis Facility (if applicable)   Name:  Address:  Dialysis Schedule:  Phone:  Fax:    / signature: Electronically signed by Tianna Hahn on 7/23/25 at 2:18 PM EDT    PHYSICIAN SECTION    Prognosis: Good    Condition at Discharge: Stable    Rehab Potential (if transferring to Rehab): Good    Recommended Labs or Other Treatments After Discharge: PT     Physician Certification: I certify the above information and transfer of Hernán Mendoza  is necessary for the continuing treatment of the diagnosis listed and that he requires Skilled Nursing Facility for less 30 days.     Update Admission H&P: No change in H&P    PHYSICIAN SIGNATURE:  Electronically signed by Nikolay Beverly DO on 7/25/25 at 4:41 PM EDT

## 2025-07-23 NOTE — CARE COORDINATION
Case Management Assessment  Initial Evaluation    Date/Time of Evaluation: 7/23/2025 1004 AM  Assessment Completed by: Tianna Hahn    If patient is discharged prior to next notation, then this note serves as note for discharge by case management.    Patient Name: Hernán Mendoza                   YOB: 1980  Diagnosis: Closed displaced comminuted fracture of shaft of left femur, initial encounter ([facility]) [S72.352A]  Closed left subtrochanteric femur fracture, initial encounter ([facility]) [S72.22XA]  Intertrochanteric fracture of left femur, closed, initial encounter ([facility]) [S72.142A]                   Date / Time: 7/22/2025 12:48 AM    Patient Admission Status: Inpatient   Readmission Risk (Low < 19, Mod (19-27), High > 27): Readmission Risk Score: 9.8    Current PCP: No primary care provider on file.  PCP verified by CM? Yes (Jennifer Cheney)    Chart Reviewed: Yes      History Provided by: Patient  Patient Orientation: Alert and Oriented, Person, Place, Situation, Self    Patient Cognition:      Hospitalization in the last 30 days (Readmission):  No    If yes, Readmission Assessment in  Navigator will be completed.    Advance Directives:      Code Status: Full Code   Patient's Primary Decision Maker is: Legal Next of Kin      Discharge Planning:    Patient lives with: Alone Type of Home: Apartment  Primary Care Giver: Self  Patient Support Systems include: Parent   Current Financial resources: Other (Comment) (worker's comp)  Current community resources: None  Current services prior to admission: (P) None            Current DME:              Type of Home Care services:  (P) OT, PT, Nursing Services    ADLS  Prior functional level: Independent in ADLs/IADLs  Current functional level: Assistance with the following:, Bathing, Dressing, Toileting, Cooking, Housework, Shopping, Mobility, Other (see comment) (needs assist d/t injuries)    PT AM-PAC: 14 /24  OT AM-PAC: 16 /24    Family can provide assistance at DC:

## 2025-07-23 NOTE — PROGRESS NOTES
Orthopedic Progress Note    Patient:  Hernán Mendoza  YOB: 1980     45 y.o. male    Subjective:  Patient seen and examined at bedside this morning.  Patient states he was restless a little overnight but feels better compared to yesterday.  Was complaining of low back pain due to the bed and lying on his back.  Left leg pain is well controlled on current regimen. Denies fever, HA, CP, SOB, N/V, new numbness/tingling. No BM, but flatus +.  Voiding appropriately.     Vitals reviewed, afebrile    Objective:   Vitals:    07/23/25 0400   BP: 119/70   Pulse:    Resp: 18   Temp: 99.2 °F (37.3 °C)   SpO2: 94%     Gen: NAD, cooperative    Cardiovascular: Regular rate on the monitor  Respiratory: No acute respiratory distress, breathing comfortably    Orthopedic Exam  LLE:    Patient has Optifoams on LLE with pinpoint strikethrough distally and dime size strikethrough proximally, skin otherwise intact without barbi-incisional erythema ecchymosis or edema.  Compartments soft and compressible. 2+ DP/PT pulses with BCR.  Limb warm and well-perfused. TA/EHL/FHL/GS motor intact. Saph/Gin/DP/SP nerves SILT.       Recent Labs     07/21/25 2001   WBC 6.1   HGB 13.9   HCT 41.6      INR 1.0      K 3.4*   BUN 18   CREATININE 1.0   GLUCOSE 101*      Meds:   DVT ppx: ASA  See rec for complete list    Impression 45 y.o. male who FFSH being seen for:    Left segmental subtrochanteric femur fracture    Procedure (7/22/25):  - Removal of skeletal traction to the left lower extremity  - Left femur intramedullary nailing     Plan  - No further plans for orthopedic intervention at this time  - Postop Ancef completed  - Optifoam on LLE. Do not remove optifoam dressings.  Okay to reinforce/maintain by nursing if necessary. Please notify Ortho if saturated.  - WBAT  to the LLE  - Pain control and medical management per primary: Orthopedics  - Multimodal pain control ordered   - DVT ppx: ASA. At minimum recommend

## 2025-07-23 NOTE — PLAN OF CARE
Problem: Discharge Planning  Goal: Discharge to home or other facility with appropriate resources  7/23/2025 0413 by Thi Johns RN  Outcome: Progressing  7/22/2025 1838 by Alecia Encarnacion RN  Outcome: Progressing     Problem: Safety - Adult  Goal: Free from fall injury  7/23/2025 0413 by Thi Johns RN  Outcome: Progressing  7/22/2025 1838 by Alecia Encarnacion RN  Outcome: Progressing     Problem: Pain  Goal: Verbalizes/displays adequate comfort level or baseline comfort level  7/23/2025 0413 by Thi Johns RN  Outcome: Progressing  7/22/2025 1838 by Alecia Encarnacion RN  Outcome: Progressing     Problem: Skin/Tissue Integrity  Goal: Skin integrity remains intact  Description: 1.  Monitor for areas of redness and/or skin breakdown  2.  Assess vascular access sites hourly  3.  Every 4-6 hours minimum:  Change oxygen saturation probe site  4.  Every 4-6 hours:  If on nasal continuous positive airway pressure, respiratory therapy assess nares and determine need for appliance change or resting period  7/23/2025 0413 by Thi Johns RN  Outcome: Progressing  7/22/2025 1838 by Alecia Encarnacion RN  Outcome: Progressing     Problem: ABCDS Injury Assessment  Goal: Absence of physical injury  7/23/2025 0413 by Thi Johns RN  Outcome: Progressing  7/22/2025 1838 by Alecia Encarnacion RN  Outcome: Progressing

## 2025-07-24 PROCEDURE — 1200000000 HC SEMI PRIVATE

## 2025-07-24 PROCEDURE — 6370000000 HC RX 637 (ALT 250 FOR IP)

## 2025-07-24 PROCEDURE — 97530 THERAPEUTIC ACTIVITIES: CPT

## 2025-07-24 PROCEDURE — 97116 GAIT TRAINING THERAPY: CPT

## 2025-07-24 PROCEDURE — 2500000003 HC RX 250 WO HCPCS

## 2025-07-24 PROCEDURE — 97535 SELF CARE MNGMENT TRAINING: CPT

## 2025-07-24 RX ORDER — OXYCODONE AND ACETAMINOPHEN 5; 325 MG/1; MG/1
1 TABLET ORAL EVERY 6 HOURS PRN
Qty: 28 TABLET | Refills: 0 | Status: SHIPPED | OUTPATIENT
Start: 2025-07-24 | End: 2025-07-25

## 2025-07-24 RX ADMIN — ACETAMINOPHEN 650 MG: 325 TABLET ORAL at 21:16

## 2025-07-24 RX ADMIN — OXYCODONE HYDROCHLORIDE 10 MG: 10 TABLET ORAL at 06:49

## 2025-07-24 RX ADMIN — CYCLOBENZAPRINE 5 MG: 10 TABLET, FILM COATED ORAL at 21:16

## 2025-07-24 RX ADMIN — ACETAMINOPHEN 650 MG: 325 TABLET ORAL at 08:05

## 2025-07-24 RX ADMIN — OXYCODONE HYDROCHLORIDE 10 MG: 10 TABLET ORAL at 15:12

## 2025-07-24 RX ADMIN — CYCLOBENZAPRINE 5 MG: 10 TABLET, FILM COATED ORAL at 14:19

## 2025-07-24 RX ADMIN — POTASSIUM CHLORIDE 40 MEQ: 1500 TABLET, EXTENDED RELEASE ORAL at 08:05

## 2025-07-24 RX ADMIN — ACETAMINOPHEN 650 MG: 325 TABLET ORAL at 14:19

## 2025-07-24 RX ADMIN — SODIUM CHLORIDE, PRESERVATIVE FREE 10 ML: 5 INJECTION INTRAVENOUS at 21:17

## 2025-07-24 RX ADMIN — DOCUSATE SODIUM 100 MG: 100 CAPSULE, LIQUID FILLED ORAL at 08:05

## 2025-07-24 RX ADMIN — CYCLOBENZAPRINE 5 MG: 10 TABLET, FILM COATED ORAL at 08:05

## 2025-07-24 RX ADMIN — ACETAMINOPHEN 650 MG: 325 TABLET ORAL at 03:18

## 2025-07-24 RX ADMIN — CYCLOBENZAPRINE 5 MG: 10 TABLET, FILM COATED ORAL at 02:14

## 2025-07-24 RX ADMIN — OXYCODONE HYDROCHLORIDE 10 MG: 10 TABLET ORAL at 10:48

## 2025-07-24 RX ADMIN — SODIUM CHLORIDE, PRESERVATIVE FREE 10 ML: 5 INJECTION INTRAVENOUS at 08:05

## 2025-07-24 RX ADMIN — OXYCODONE HYDROCHLORIDE 10 MG: 10 TABLET ORAL at 22:07

## 2025-07-24 RX ADMIN — ASPIRIN 81 MG: 81 TABLET, COATED ORAL at 08:05

## 2025-07-24 RX ADMIN — NAPROXEN 500 MG: 250 TABLET ORAL at 08:05

## 2025-07-24 RX ADMIN — NAPROXEN 500 MG: 250 TABLET ORAL at 16:55

## 2025-07-24 RX ADMIN — ASPIRIN 81 MG: 81 TABLET, COATED ORAL at 21:16

## 2025-07-24 RX ADMIN — OXYCODONE HYDROCHLORIDE 10 MG: 10 TABLET ORAL at 18:30

## 2025-07-24 RX ADMIN — OXYCODONE HYDROCHLORIDE 10 MG: 10 TABLET ORAL at 02:13

## 2025-07-24 ASSESSMENT — PAIN DESCRIPTION - ORIENTATION
ORIENTATION: LEFT

## 2025-07-24 ASSESSMENT — PAIN SCALES - GENERAL
PAINLEVEL_OUTOF10: 4
PAINLEVEL_OUTOF10: 6
PAINLEVEL_OUTOF10: 0
PAINLEVEL_OUTOF10: 4
PAINLEVEL_OUTOF10: 7
PAINLEVEL_OUTOF10: 6
PAINLEVEL_OUTOF10: 8
PAINLEVEL_OUTOF10: 8
PAINLEVEL_OUTOF10: 7
PAINLEVEL_OUTOF10: 7
PAINLEVEL_OUTOF10: 4
PAINLEVEL_OUTOF10: 4
PAINLEVEL_OUTOF10: 7
PAINLEVEL_OUTOF10: 7

## 2025-07-24 ASSESSMENT — PAIN - FUNCTIONAL ASSESSMENT: PAIN_FUNCTIONAL_ASSESSMENT: PREVENTS OR INTERFERES SOME ACTIVE ACTIVITIES AND ADLS

## 2025-07-24 ASSESSMENT — PAIN DESCRIPTION - FREQUENCY: FREQUENCY: CONTINUOUS

## 2025-07-24 ASSESSMENT — PAIN DESCRIPTION - DESCRIPTORS
DESCRIPTORS: ACHING
DESCRIPTORS: TIGHTNESS;THROBBING
DESCRIPTORS: ACHING

## 2025-07-24 ASSESSMENT — PAIN DESCRIPTION - LOCATION
LOCATION: LEG
LOCATION: HIP
LOCATION: HIP
LOCATION: HIP;LEG
LOCATION: HIP

## 2025-07-24 ASSESSMENT — PAIN SCALES - WONG BAKER: WONGBAKER_NUMERICALRESPONSE: NO HURT

## 2025-07-24 ASSESSMENT — PAIN DESCRIPTION - ONSET: ONSET: ON-GOING

## 2025-07-24 ASSESSMENT — PAIN DESCRIPTION - PAIN TYPE: TYPE: SURGICAL PAIN

## 2025-07-24 NOTE — PLAN OF CARE
Problem: Discharge Planning  Goal: Discharge to home or other facility with appropriate resources  7/24/2025 1655 by Gifty Santoyo RN  Outcome: Progressing     Problem: Safety - Adult  Goal: Free from fall injury    Problem: ABCDS Injury Assessment  Goal: Absence of physical injury  7/24/2025 1655 by Gifty Santoyo RN  Outcome: Progressing

## 2025-07-24 NOTE — CARE COORDINATION
Call from [facility] and they have accepted and are 1st choice. P/S sent to ortho to complete 1st report of injury and C-9.    10:35 PM Ortho resident came up to floor to get WC paperwork to fill out and hihlighted areas to have Dr Beverly sign. Reviewed how to fill out and explained 1st report of injury and C-9.    12:00 PM message from Ortho (Dr Alex De) that went to ortho clinic and they do not fill out 1st report of injury, but Dr Beverly agreed to sign. Dr Beverly did not sign C-9. States are in clinic this afternoon and can call the office staff for assistance in filling out form.    1:30 PM Clinic unable to assist w/ paperwork for worker's comp.     3:30 PM CM completed 1st report of injury. Dr Beverly not available to sign C-9.

## 2025-07-24 NOTE — PROGRESS NOTES
Physical Therapy  Facility/Department: [address] ORTHO/MED SURG   Physical Therapy Daily Treatment Note    Patient Name: Hernán Mendoza        MRN: [id number]    : 1980    Date of Service: 2025    Chief Complaint   Patient presents with    Fall    Leg Injury     femur     Past Medical History:  has a past medical history of Achilles tendinitis and Arthritis.  Past Surgical History:  has a past surgical history that includes Leg Surgery (Right); Femur Sindy nail insertion (Left, 2025); and Femur Surgery (Left, 2025).    Discharge Recommendations  Discharge Recommendations: Patient would benefit from continued therapy after discharge  PT Equipment Recommendations  Equipment Needed: Yes  Mobility Devices: Walker  Walker: Rolling    Assessment  Body Structures, Functions, Activity Limitations Requiring Skilled Therapeutic Intervention: Decreased functional mobility ;Decreased strength;Increased pain;Decreased balance;Decreased endurance  Assessment: Pt able to perform bed mobility with maxA, and required Rebecca for STS transfers with RW. Pt ambulated 50ft with RW requiring min/modA. Pt most limited by increased pain and decreased balance and endurance. Recommending continued PT to address deficits and maximize safety and independence with mobility.  Therapy Prognosis: Good  Requires PT Follow-Up: Yes  Activity Tolerance  Activity Tolerance: Patient limited by pain, Patient limited by endurance  Safety Devices  Type of Devices: Gait belt, Left in chair, Chair alarm in place, Patient at risk for falls (Pt left with OT present in room)    AM-PAC  AM-PAC Basic Mobility - Inpatient   How much help is needed turning from your back to your side while in a flat bed without using bedrails?: A Little  How much help is needed moving from lying on your back to sitting on the side of a flat bed without using bedrails?: A Little  How much help is needed moving to and from a bed to a chair?: A Little  How much help is  progression, increased time/effort secondary to pain.    Transfers  Sit to Stand: Minimal Assistance  Stand to Sit: Minimal Assistance  Comment: Transfers performed x1 from EOB with RW, cueing required for hand placement with good return demonstrated.    Ambulation  Surface: Level tile  Device: Rolling Walker  Other Apparatus: Wheelchair follow  Assistance: Partial/Moderate assistance  Quality of Gait: step-to gait, heavy reliance on RW, decreased heel strike on L foot  Gait Deviations: Slow Katie;Decreased step length;Decreased step height  Distance: 50ft  Comments: Pt required cueing to advance RW further forwards, and to not step too far into RW as it tends to throw balance posterior. Pt with heavy reliance on RW, ambulating with a step-to gait pattern. Pt initially requiring CGA/Rebecca progressing to modA at end of ambulation secondary to fatigue and anterior lean.  More Ambulation?: No    Stairs/Curb  Stairs?: No      Balance   Balance  Posture: Good  Sitting - Static: Good  Sitting - Dynamic: Fair  Standing - Static: Fair  Standing - Dynamic: Fair  Comments: standing balance assessed with RW.    Exercise  PT Exercises  Exercise Treatment: Educated on importance of exercises to promote increased strength and ROM.  Seated LE exercise program: Long Arc Quads, hip abduction/adduction, heel/toe raises, and marches. Reps: x10 BLE  Comments: pt educated on performance of quad sets and glute sets.       Patient Education  Patient Education  Education Given To: Patient  Education Provided: Role of Therapy;Plan of Care;Home Exercise Program;Transfer Training;Mobility Training  Education Provided Comments: importance of mobility, exercises to promote strength and ROM.  Education Method: Verbal;Demonstration  Barriers to Learning: None  Education Outcome: Verbalized understanding;Demonstrated understanding    Plan  Physical Therapy Plan  General Plan: 6-7 times per week  Current Treatment Recommendations: Strengthening,

## 2025-07-24 NOTE — PROGRESS NOTES
Orthopedic Progress Note    Patient:  Hernán Mendoza  YOB: 1980     45 y.o. male    Subjective:  Patient seen and examined at bedside this morning. States he was able to ambulate to his chair and back. He has been stretching his leg while in the chair as well.  Left leg pain is well controlled on current regimen. Denies fever, HA, CP, SOB, N/V, new numbness/tingling. No BM, but flatus +.  Voiding appropriately. Total ambulation 10' with a RW with PT.    Vitals reviewed, afebrile    Objective:   Vitals:    07/24/25 0243   BP:    Pulse:    Resp: 16   Temp:    SpO2:      Gen: NAD, cooperative    Cardiovascular: Regular rate on the monitor  Respiratory: No acute respiratory distress, breathing comfortably    Orthopedic Exam    LLE: Patient has Optifoams on LLE with pinpoint strikethrough distally and dime size strikethrough proximally, skin otherwise intact without erythema, ecchymosis or edema.  Compartments soft and compressible. 2+ DP/PT pulses with BCR.  Limb warm and well-perfused. TA/EHL/FHL/GS motor intact.  Sural/saphenous/SPN/DPN/plantar nerves SILT.       Recent Labs     07/21/25 2001 07/23/25  0551   WBC 6.1 5.4   HGB 13.9 9.8*   HCT 41.6 29.2*    171   INR 1.0  --     134*   K 3.4* 3.4*   BUN 18 10   CREATININE 1.0 0.8   GLUCOSE 101* 103*      Meds:   DVT ppx: ASA 81 mg twice daily  See rec for complete list    Impression 45 y.o. male who FFSH being seen for:    Left segmental subtrochanteric femur fracture    Procedure (7/22/25):  - Removal of skeletal traction to the left lower extremity  - Left femur intramedullary nailing     Plan  - No further plans for orthopedic intervention at this time  - Postop Ancef completed  - Optifoam on LLE. Do not remove optifoam dressings.  Okay to reinforce/maintain by nursing if necessary. Please notify Ortho if saturated.  - WBAT  to the LLE  - Pain control and medical management per primary: Orthopedics  - Multimodal pain control

## 2025-07-24 NOTE — PLAN OF CARE
Problem: Discharge Planning  Goal: Discharge to home or other facility with appropriate resources  7/24/2025 0451 by Thi Johns RN  Outcome: Progressing  7/23/2025 1523 by Steffi Alexandra RN  Outcome: Progressing     Problem: Safety - Adult  Goal: Free from fall injury  7/24/2025 0451 by Thi Johns RN  Outcome: Progressing  7/23/2025 1523 by Steffi Alexandra RN  Outcome: Progressing     Problem: Pain  Goal: Verbalizes/displays adequate comfort level or baseline comfort level  7/24/2025 0451 by Thi Johns RN  Outcome: Progressing  7/23/2025 1523 by Steffi Alexandra RN  Outcome: Progressing     Problem: Skin/Tissue Integrity  Goal: Skin integrity remains intact  Description: 1.  Monitor for areas of redness and/or skin breakdown  2.  Assess vascular access sites hourly  3.  Every 4-6 hours minimum:  Change oxygen saturation probe site  4.  Every 4-6 hours:  If on nasal continuous positive airway pressure, respiratory therapy assess nares and determine need for appliance change or resting period  Outcome: Progressing     Problem: ABCDS Injury Assessment  Goal: Absence of physical injury  Outcome: Progressing

## 2025-07-24 NOTE — PROGRESS NOTES
Occupational Therapy  Occupational Therapy Daily Treatment Note  Facility/Department: [address] ORTHO/MED SURG   Patient Name: Hernán Mendoza        MRN: [id number]    : 1980    Date of Service: 2025    Chief Complaint   Patient presents with    Fall    Leg Injury     femur     Past Medical History:  has a past medical history of Achilles tendinitis and Arthritis.  Past Surgical History:  has a past surgical history that includes Leg Surgery (Right); Femur Sindy nail insertion (Left, 2025); and Femur Surgery (Left, 2025).    Discharge Recommendations  Discharge Recommendations: Patient would benefit from continued therapy after discharge, Patient able to tolerate 3 hours of therapy per day  OT Equipment Recommendations  Walker: Rolling  ADL Assistive Devices: Sock-Aid Hard;Reacher;Long-handled Sponge;Transfer Tub Bench    Assessment  Performance deficits / Impairments: Decreased functional mobility ;Decreased balance;Decreased ADL status;Decreased endurance;Decreased high-level IADLs;Decreased strength  Assessment: Pt limited by above deficits impacting ADL completion, ADL transfers and safe functional mobility to maximize pts potential and quality of life, continue with skilled OT during acute hospital stay and  post sicharge to decrease caregiver burden and enable pt to return home at maximum level of function.  Prognosis: Good  Activity Tolerance  Activity Tolerance: Patient limited by pain, Patient limited by endurance  Safety Devices  Type of Devices: Gait belt, Left in chair, Chair alarm in place, Patient at risk for falls    AM-PAC  AM-PAC Daily Activity - Inpatient   How much help is needed for putting on and taking off regular lower body clothing?: A Lot  How much help is needed for bathing (which includes washing, rinsing, drying)?: A Little  How much help is needed for toileting (which includes using toilet, bedpan, or urinal)?: A Lot  How much help is needed for putting on and taking off  tolerance , while PT is addressing their individualized functional mobility task.   Electronically signed by VALENTIN Polanco on 7/24/25 at 12:02 PM EDT

## 2025-07-25 PROCEDURE — 97530 THERAPEUTIC ACTIVITIES: CPT

## 2025-07-25 PROCEDURE — 2500000003 HC RX 250 WO HCPCS

## 2025-07-25 PROCEDURE — 97535 SELF CARE MNGMENT TRAINING: CPT

## 2025-07-25 PROCEDURE — 6370000000 HC RX 637 (ALT 250 FOR IP)

## 2025-07-25 PROCEDURE — 1200000000 HC SEMI PRIVATE

## 2025-07-25 PROCEDURE — 97110 THERAPEUTIC EXERCISES: CPT

## 2025-07-25 PROCEDURE — 97116 GAIT TRAINING THERAPY: CPT

## 2025-07-25 RX ORDER — OXYCODONE AND ACETAMINOPHEN 5; 325 MG/1; MG/1
1 TABLET ORAL EVERY 6 HOURS PRN
Qty: 28 TABLET | Refills: 0 | Status: SHIPPED | OUTPATIENT
Start: 2025-07-25 | End: 2025-08-01

## 2025-07-25 RX ADMIN — OXYCODONE HYDROCHLORIDE 10 MG: 10 TABLET ORAL at 16:15

## 2025-07-25 RX ADMIN — OXYCODONE HYDROCHLORIDE 10 MG: 10 TABLET ORAL at 03:21

## 2025-07-25 RX ADMIN — CYCLOBENZAPRINE 5 MG: 10 TABLET, FILM COATED ORAL at 01:36

## 2025-07-25 RX ADMIN — ACETAMINOPHEN 650 MG: 325 TABLET ORAL at 14:28

## 2025-07-25 RX ADMIN — ASPIRIN 81 MG: 81 TABLET, COATED ORAL at 07:31

## 2025-07-25 RX ADMIN — NAPROXEN 500 MG: 250 TABLET ORAL at 07:32

## 2025-07-25 RX ADMIN — ACETAMINOPHEN 650 MG: 325 TABLET ORAL at 19:28

## 2025-07-25 RX ADMIN — ASPIRIN 81 MG: 81 TABLET, COATED ORAL at 19:28

## 2025-07-25 RX ADMIN — OXYCODONE HYDROCHLORIDE 10 MG: 10 TABLET ORAL at 11:51

## 2025-07-25 RX ADMIN — NAPROXEN 500 MG: 250 TABLET ORAL at 16:15

## 2025-07-25 RX ADMIN — SODIUM CHLORIDE, PRESERVATIVE FREE 10 ML: 5 INJECTION INTRAVENOUS at 19:29

## 2025-07-25 RX ADMIN — OXYCODONE HYDROCHLORIDE 10 MG: 10 TABLET ORAL at 20:31

## 2025-07-25 RX ADMIN — ACETAMINOPHEN 650 MG: 325 TABLET ORAL at 01:36

## 2025-07-25 RX ADMIN — ACETAMINOPHEN 650 MG: 325 TABLET ORAL at 09:03

## 2025-07-25 RX ADMIN — CYCLOBENZAPRINE 5 MG: 10 TABLET, FILM COATED ORAL at 14:28

## 2025-07-25 RX ADMIN — SODIUM CHLORIDE, PRESERVATIVE FREE 10 ML: 5 INJECTION INTRAVENOUS at 07:35

## 2025-07-25 RX ADMIN — OXYCODONE HYDROCHLORIDE 10 MG: 10 TABLET ORAL at 07:31

## 2025-07-25 RX ADMIN — CYCLOBENZAPRINE 5 MG: 10 TABLET, FILM COATED ORAL at 07:32

## 2025-07-25 RX ADMIN — CYCLOBENZAPRINE 5 MG: 10 TABLET, FILM COATED ORAL at 19:28

## 2025-07-25 RX ADMIN — DOCUSATE SODIUM 100 MG: 100 CAPSULE, LIQUID FILLED ORAL at 07:31

## 2025-07-25 ASSESSMENT — PAIN DESCRIPTION - LOCATION
LOCATION: HIP
LOCATION: LEG
LOCATION: HIP
LOCATION: HIP

## 2025-07-25 ASSESSMENT — PAIN DESCRIPTION - ORIENTATION
ORIENTATION: LEFT

## 2025-07-25 ASSESSMENT — PAIN SCALES - GENERAL
PAINLEVEL_OUTOF10: 7
PAINLEVEL_OUTOF10: 4
PAINLEVEL_OUTOF10: 7
PAINLEVEL_OUTOF10: 7
PAINLEVEL_OUTOF10: 4
PAINLEVEL_OUTOF10: 0
PAINLEVEL_OUTOF10: 4
PAINLEVEL_OUTOF10: 8
PAINLEVEL_OUTOF10: 0
PAINLEVEL_OUTOF10: 7
PAINLEVEL_OUTOF10: 8

## 2025-07-25 ASSESSMENT — PAIN SCALES - WONG BAKER
WONGBAKER_NUMERICALRESPONSE: NO HURT
WONGBAKER_NUMERICALRESPONSE: NO HURT

## 2025-07-25 ASSESSMENT — PAIN DESCRIPTION - DESCRIPTORS
DESCRIPTORS: ACHING

## 2025-07-25 NOTE — PROGRESS NOTES
Orthopedic Progress Note    Patient:  Hernán Mendoza  YOB: 1980     45 y.o. male    Subjective:  Patient seen and examined this morning at bedside.  Patient states he is little sore this morning but otherwise no complaints or concerns. No issues overnight per nursing. Pain is well controlled on current regimen. Denies CP, SOB, N/V, new numbness/tingling. No BM, but flatus +.  Voiding appropriately. Was able to ambulate with PT yesterday 50 feet with rolling walker.    Vitals reviewed, afebrile    Objective:   Vitals:    07/24/25 2237   BP:    Pulse:    Resp: 18   Temp:    SpO2:      Gen: NAD, cooperative    Cardiovascular: Regular rate on monitor  Respiratory: No acute respiratory distress, breathing comfortably    Orthopedic Exam  LLE: Optifoam to remain on LLE with pinpoint strikethrough.  Skin is otherwise intact without erythema ecchymosis.  Compartments are soft compressible.  Sural/saphenous/SPN/DPN/plantar nerves sensitive to light touch. TA/EHL/FHL/GS motor intact. 2+ DP and PT pulses.  Limb is warm and well-perfused.      Recent Labs     07/23/25  0551   WBC 5.4   HGB 9.8*   HCT 29.2*      *   K 3.4*   BUN 10   CREATININE 0.8   GLUCOSE 103*      Meds:   DVT ppx: ASA  See rec for complete list    Impression 45 y.o. male who FFSH being seen for:    - Left peritrochanteric femur fracture      Procedure (7/22/25):  - Removal of skeletal traction to the left lower extremity  - Left femur intramedullary nailing        Plan  - No further plans for orthopedic intervention at this time  - Optifoam on LLE. Do not remove optifoam dressings.  Okay to reinforce/maintain by nursing if necessary. Please notify Ortho if saturated.  - WBAT  to the LLE  - Pain control and medical management per primary: Orthopedics  - Multimodal pain control ordered   - DVT ppx: ASA twice daily  - Ice/Elevate LLE to control pain and edema  - Diet: Adult diet okay from orthopedic perspective  - Continue

## 2025-07-25 NOTE — PROGRESS NOTES
Physical Therapy  Facility/Department: [address] ORTHO/MED SURG   Physical Therapy Daily Treatment Note    Patient Name: Hernán Mendoza        MRN: [id number]    : 1980    Date of Service: 2025    Chief Complaint   Patient presents with    Fall    Leg Injury     femur     Past Medical History:  has a past medical history of Achilles tendinitis and Arthritis.  Past Surgical History:  has a past surgical history that includes Leg Surgery (Right); Femur Sindy nail insertion (Left, 2025); and Femur Surgery (Left, 2025).    Discharge Recommendations  Discharge Recommendations: Patient would benefit from continued therapy after discharge  PT Equipment Recommendations  Equipment Needed: Yes  Mobility Devices: Walker  Walker: Rolling    Assessment  Body Structures, Functions, Activity Limitations Requiring Skilled Therapeutic Intervention: Decreased functional mobility ;Decreased strength;Increased pain;Decreased balance;Decreased endurance  Assessment: Pt able to perform transfers with RW and CGA, and ambulated 50ft x2 with RW and Rebecca. Pt overall with fair stability and sequencing throughout. Pt most limited by decreased endurance and balance and also increased pain. Recommending continued PT to address deficits and maximize safety and independence with mobility.  Therapy Prognosis: Good  Requires PT Follow-Up: Yes  Activity Tolerance  Activity Tolerance: Patient limited by pain, Patient limited by endurance  Safety Devices  Type of Devices: Gait belt, Left in chair, Chair alarm in place, Patient at risk for falls, Nurse notified    AM-PAC  AM-PAC Basic Mobility - Inpatient   How much help is needed turning from your back to your side while in a flat bed without using bedrails?: A Little  How much help is needed moving from lying on your back to sitting on the side of a flat bed without using bedrails?: A Little  How much help is needed moving to and from a bed to a chair?: A Little  How much help is

## 2025-07-25 NOTE — CARE COORDINATION
Completed C-9  for [facility] faxed to [facility] comp ZAK Andrew [phone]. Spoke to Lorrie by phone and states will discuss w/ her supervisor and call CM back.    Call back from Lorrie and states that did not realize patient could not go to a swing bed. Can approve traditional SNF. CM Was able to get patient accepted by [facility] ([facility]) and they can accept today or tomorrow. Call back to Lorrie and she will fax authorization to [facility].    4:45 PM Received authorization/C-9 for [facility] and [facility] no later than 8 PM .    5:30 PM [facility] w[facility] for 2 PM.

## 2025-07-25 NOTE — PROGRESS NOTES
Occupational Therapy  Occupational Therapy Daily Treatment Note  Facility/Department: [address] ORTHO/MED SURG   Patient Name: Hernán Mendoza        MRN: [id number]    : 1980    Date of Service: 2025    Chief Complaint   Patient presents with    Fall    Leg Injury     femur     Past Medical History:  has a past medical history of Achilles tendinitis and Arthritis.  Past Surgical History:  has a past surgical history that includes Leg Surgery (Right); Femur Sindy nail insertion (Left, 2025); and Femur Surgery (Left, 2025).    Discharge Recommendations  Discharge Recommendations: Patient would benefit from continued therapy after discharge  Further therapy recommended at discharge.The patient should be able to tolerate at least 3 hours of therapy per day over 5 days or 15 hours over 7 days.   This patient may benefit from a Physical Medicine and Rehab consult.    OT Equipment Recommendations  Equipment Needed: Yes  Mobility Devices: ADL Assistive Devices  Walker: Rolling  ADL Assistive Devices: Sock-Aid Hard;Reacher;Long-handled Sponge;Transfer Tub Bench    Assessment  Performance deficits / Impairments: Decreased functional mobility ;Decreased balance;Decreased ADL status;Decreased endurance;Decreased high-level IADLs  Assessment: Pt. engaged in therapy session. Pt. Min A for bed mobility d/t required assist to progress LLE to EOB. Pt. CGA w/ RW for functional sit <> stand transfers and functional mobility. Pt. requried increased time/effort to progress LLE w/ RW. Intermittent rest breaks provided while standing and seated throughout session. While standing CGA w/RW, unilateral hand support onto sinkside and bedside tray table intermittently. Standing at bedside tray table, pt. completed dynamic functional activity to fold 10 items. Throughout session, pt. educated and encouraged to WBAT to LLE. Greatest limitations exhibited during  this session include decreased balance, decreased endurance, and

## 2025-07-25 NOTE — PROGRESS NOTES
CLINICAL PHARMACY NOTE: MEDS TO BEDS    Total # of Prescriptions Filled: 0   The following medications were delivered to the patient:  DOK 100MG   PERCOCET 5-325  FLEXERIL 10MG  ASA 81MG    Additional Documentation:     I WENT BACK AND RETRIEVED RXS FROM RN, PT ENDED UP GOING TO A SNF

## 2025-07-25 NOTE — ANESTHESIA POSTPROCEDURE EVALUATION
Department of Anesthesiology  Postprocedure Note    Patient: Hernán Mendoza  MRN: [id number]  YOB: 1980  Date of evaluation: 7/25/2025    Procedure Summary       Date: 07/22/25 Room / Location: [address]    Anesthesia Start: 1328 Anesthesia Stop: 1524    Procedure: REMOVAL OF SKELETAL TRACTION. INTRAMEDULLARY NAIL INSERTION OF FEMUR FRACTURE (Left: Leg Upper) Diagnosis:       Closed fracture of left femur, unspecified fracture morphology, unspecified portion of femur, initial encounter ([facility])      (Closed fracture of left femur, unspecified fracture morphology, unspecified portion of femur, initial encounter ([facility]) [S72.92XA])    Surgeons: Nikolay Beverly DO Responsible Provider: Patricia Bird MD    Anesthesia Type: general ASA Status: 2            Anesthesia Type: No value filed.    Cait Phase I: Cait Score: 10    Cait Phase II:      Anesthesia Post Evaluation    Patient location during evaluation: bedside  Patient participation: complete - patient participated  Level of consciousness: awake and alert  Airway patency: patent  Nausea & Vomiting: no nausea and no vomiting  Cardiovascular status: hemodynamically stable  Respiratory status: acceptable  Hydration status: stable  Comments: BP (!) 114/94   Pulse 81   Temp 98.4 °F (36.9 °C)   Resp 18   Ht 1.854 m (6' 1\") Comment: stated  Wt 102.1 kg (225 lb) Comment: stated; pt in traction  SpO2 94%   BMI 29.69 kg/m²     Pain management: adequate    No notable events documented.

## 2025-07-26 VITALS
WEIGHT: 225 LBS | HEIGHT: 73 IN | OXYGEN SATURATION: 99 % | TEMPERATURE: 96.9 F | BODY MASS INDEX: 29.82 KG/M2 | RESPIRATION RATE: 16 BRPM | SYSTOLIC BLOOD PRESSURE: 125 MMHG | DIASTOLIC BLOOD PRESSURE: 90 MMHG | HEART RATE: 89 BPM

## 2025-07-26 PROCEDURE — 6370000000 HC RX 637 (ALT 250 FOR IP)

## 2025-07-26 PROCEDURE — 2500000003 HC RX 250 WO HCPCS

## 2025-07-26 RX ADMIN — NAPROXEN 500 MG: 250 TABLET ORAL at 08:42

## 2025-07-26 RX ADMIN — POTASSIUM CHLORIDE 40 MEQ: 1500 TABLET, EXTENDED RELEASE ORAL at 08:42

## 2025-07-26 RX ADMIN — DOCUSATE SODIUM 100 MG: 100 CAPSULE, LIQUID FILLED ORAL at 08:42

## 2025-07-26 RX ADMIN — ACETAMINOPHEN 650 MG: 325 TABLET ORAL at 08:41

## 2025-07-26 RX ADMIN — SODIUM CHLORIDE, PRESERVATIVE FREE 10 ML: 5 INJECTION INTRAVENOUS at 08:52

## 2025-07-26 RX ADMIN — CYCLOBENZAPRINE 5 MG: 10 TABLET, FILM COATED ORAL at 08:42

## 2025-07-26 RX ADMIN — OXYCODONE HYDROCHLORIDE 10 MG: 10 TABLET ORAL at 08:42

## 2025-07-26 RX ADMIN — SODIUM CHLORIDE, PRESERVATIVE FREE 10 ML: 5 INJECTION INTRAVENOUS at 08:51

## 2025-07-26 RX ADMIN — ACETAMINOPHEN 650 MG: 325 TABLET ORAL at 01:33

## 2025-07-26 RX ADMIN — OXYCODONE HYDROCHLORIDE 10 MG: 10 TABLET ORAL at 01:33

## 2025-07-26 RX ADMIN — CYCLOBENZAPRINE 5 MG: 10 TABLET, FILM COATED ORAL at 01:33

## 2025-07-26 RX ADMIN — ASPIRIN 81 MG: 81 TABLET, COATED ORAL at 08:41

## 2025-07-26 ASSESSMENT — PAIN DESCRIPTION - DESCRIPTORS: DESCRIPTORS: ACHING

## 2025-07-26 ASSESSMENT — PAIN SCALES - GENERAL
PAINLEVEL_OUTOF10: 0
PAINLEVEL_OUTOF10: 9
PAINLEVEL_OUTOF10: 3
PAINLEVEL_OUTOF10: 8

## 2025-07-26 ASSESSMENT — PAIN DESCRIPTION - ORIENTATION: ORIENTATION: LEFT

## 2025-07-26 ASSESSMENT — PAIN SCALES - WONG BAKER
WONGBAKER_NUMERICALRESPONSE: NO HURT
WONGBAKER_NUMERICALRESPONSE: NO HURT

## 2025-07-26 ASSESSMENT — PAIN DESCRIPTION - LOCATION: LOCATION: LEG;KNEE

## 2025-07-26 NOTE — PROGRESS NOTES
Patient discharged, IV removed, belongings returned, Patient concerned about homemeds that were delivered earlier in the week that were nowhere to be found, Writer called the [facility] who confirmed that the meds were returned to them due to the patient discharging to a SNF, Patient agreeable to that, DC education given to the patient, all questions answered, report called to valery), all questions answered, patient transport took patient and his belongings via wheel chair.

## 2025-07-26 NOTE — CARE COORDINATION
Case Management   Daily Progress Note       Patient Name: Hernán Mendoza                   YOB: 1980  Diagnosis: Closed displaced comminuted fracture of shaft of left femur, initial encounter ([facility]) [S72.352A]  Closed left subtrochanteric femur fracture, initial encounter ([facility]) [S72.22XA]  Intertrochanteric fracture of left femur, closed, initial encounter ([facility]) [S72.142A]                         days  Length of Stay: 4  days    Anticipated Discharge Date: Ready for discharge    Readmission Risk (Low < 19, Mod (19-27), High > 27): Readmission Risk Score: 9.4        Current Transitional Plan    [] Home Independently    [] Home with HC    [x] Skilled Nursing Facility    [] Acute Rehabilitation    [] Long Term Acute Care (LTAC)    [] Other:     Plan for the Stay (Medical Management) :          Workflow Continuation (Additional Notes) :  [facility]iha-dieter for LATISHA to call back for P/U time. 0817 call from Sabiha-LATISHA to P/U at 1000AM. 0830 patient notified. 0833 called Linda at [facility]-informed her of discharge time.      Selina Johnson, JAYA  July 26, 2025

## 2025-07-26 NOTE — CARE COORDINATION
Discharge Report    [facility]  Clinical Case Management Department  Written by: Selina Johnson RN    Patient Name: Hernán Mendoza  Attending Provider: No att. providers found  Admit Date: 2025 12:48 AM  MRN: [id number]  Account: [id number]                     : 1980  Discharge Date: 2025      Disposition: SNF-[facility]    Selina Johnson RN

## 2025-07-26 NOTE — PLAN OF CARE
Problem: Discharge Planning  Goal: Discharge to home or other facility with appropriate resources  7/26/2025 1058 by Boris Yang RN  Outcome: Progressing  7/26/2025 0628 by Aranza Ortega RN  Outcome: Progressing     Problem: Safety - Adult  Goal: Free from fall injury  7/26/2025 1058 by Boris Yang RN  Outcome: Progressing  7/26/2025 0628 by Aranza Ortega RN  Outcome: Progressing     Problem: Pain  Goal: Verbalizes/displays adequate comfort level or baseline comfort level  7/26/2025 1058 by Boris Yang RN  Outcome: Progressing  7/26/2025 0628 by Aranza Ortega RN  Outcome: Progressing     Problem: Skin/Tissue Integrity  Goal: Skin integrity remains intact  Description: 1.  Monitor for areas of redness and/or skin breakdown  2.  Assess vascular access sites hourly  3.  Every 4-6 hours minimum:  Change oxygen saturation probe site  4.  Every 4-6 hours:  If on nasal continuous positive airway pressure, respiratory therapy assess nares and determine need for appliance change or resting period  7/26/2025 1058 by Boris Yang RN  Outcome: Progressing  7/26/2025 0628 by Aranza Ortega RN  Outcome: Progressing     Problem: ABCDS Injury Assessment  Goal: Absence of physical injury  7/26/2025 1058 by Boris Yang RN  Outcome: Progressing  7/26/2025 0628 by Aranza Ortega RN  Outcome: Progressing

## 2025-07-26 NOTE — PLAN OF CARE
Problem: Discharge Planning  Goal: Discharge to home or other facility with appropriate resources  7/26/2025 0628 by Aranza Ortega RN  Outcome: Progressing  7/25/2025 1631 by Gifty Satnoyo RN  Outcome: Progressing     Problem: Safety - Adult  Goal: Free from fall injury  7/26/2025 0628 by Aranza Ortega RN  Outcome: Progressing  7/25/2025 1631 by Gifty Santoyo RN  Outcome: Progressing     Problem: Pain  Goal: Verbalizes/displays adequate comfort level or baseline comfort level  7/26/2025 0628 by Aranza Otrega RN  Outcome: Progressing  7/25/2025 1631 by Gifty Santoyo RN  Outcome: Progressing     Problem: Skin/Tissue Integrity  Goal: Skin integrity remains intact  Description: 1.  Monitor for areas of redness and/or skin breakdown  2.  Assess vascular access sites hourly  3.  Every 4-6 hours minimum:  Change oxygen saturation probe site  4.  Every 4-6 hours:  If on nasal continuous positive airway pressure, respiratory therapy assess nares and determine need for appliance change or resting period  Outcome: Progressing     Problem: ABCDS Injury Assessment  Goal: Absence of physical injury  Outcome: Progressing

## 2025-07-26 NOTE — DISCHARGE SUMMARY
Orthopedic  Discharge Summary         Patient Identification:  Hernán Mendoza is a 45 y.o. male.  :  1980  MRN: [id number]     Acct: [id number]   Admit Date:  2025  Discharge date and time: 2025 11:51 AM     Attending Provider: No att. providers found                                     Reason for Admission: Left peritrochanteric femur fracture     Discharge Diagnoses:   Patient Active Problem List   Diagnosis    Closed left subtrochanteric femur fracture, initial encounter ([facility])    Intertrochanteric fracture of left femur, closed, initial encounter ([facility])        Consults:  Internal medicine    Procedures:     Procedure (25):  - Removal of skeletal traction to the left lower extremity  - Left femur intramedullary nailing    Hospital Course:   Hernán Mendoza is a 45 y.o. male who FFSH being seen for left peritrochanteric femur fracture .  We discussed DC with patient and he is ok with DC.  All questions and concerns were addressed at this time.    Laboratory parameters were followed and optimized when possible.    Time spend on discharge discussion and plannin minutes    Disposition:   SNF    Discharged Condition:  Stable     Discharge Medications:         Medication List        START taking these medications      aspirin 81 MG EC tablet  Take 1 tablet by mouth 2 times daily     cyclobenzaprine 10 MG tablet  Commonly known as: FLEXERIL  Take 1 tablet by mouth 3 times daily as needed for Muscle spasms     docusate sodium 100 MG capsule  Commonly known as: Colace  Take 1 capsule by mouth 2 times daily for 14 days     oxyCODONE-acetaminophen 5-325 MG per tablet  Commonly known as: Percocet  Take 1 tablet by mouth every 6 hours as needed for Pain for up to 7 days. Intended supply: 7 days. Take lowest dose possible to manage pain Max Daily Amount: 4 tablets     vitamin D 1.25 MG ([id number] UT) Caps capsule  Commonly known as: ERGOCALCIFEROL  Take 1 capsule by mouth once a week for 8 doses

## 2025-07-26 NOTE — PROGRESS NOTES
Orthopedic Progress Note    Patient:  Hernán Mendoza  YOB: 1980     45 y.o. male    Subjective:  Patient seen and examined this morning. No complaints or concerns. No issues overnight per nursing. Pain is well controlled on current regimen. Denies fever, HA, CP, SOB, N/V, dysuria, new numbness/tingling.  Patient was able to ambulate 100 feet with physical therapy yesterday.  He is currently discharge planning to the [facility] later today.    Vitals reviewed, afebrile    Objective:   Vitals:    07/26/25 0203   BP:    Pulse:    Resp: 16   Temp:    SpO2:      Gen: NAD, cooperative    Cardiovascular: Regular rate, no dependent edema  Respiratory: No acute respiratory distress, breathing comfortably    Orthopedic Exam  LLE: Optifoam dressings remain intact with small amount of pinpoint strikethrough to the proximalmost dressing.  There is otherwise consistent ecchymosis over the anterior and lateral proximal thigh.  Compartments are soft and compressible.  Motor function is intact to the TA/GS/EHL/FHL motor complexes.  Sensation is intact to light touch across the SPN/DPN/sural/saphenous nerve distributions.  Limb is warm and well-perfused.        Recent Labs     07/23/25  0551   WBC 5.4   HGB 9.8*   HCT 29.2*      *   K 3.4*   BUN 10   CREATININE 0.8   GLUCOSE 103*      Meds:   DVT: ASA 81 mg twice daily  See rec for complete list    Impression 45 y.o. male who FFSH being seen for:     - Left peritrochanteric femur fracture       Procedure (7/22/25):  - Removal of skeletal traction to the left lower extremity  - Left femur intramedullary nailing         Plan  - No further plans for orthopedic intervention at this time  - Optifoam on LLE. Do not remove optifoam dressings.  Okay to reinforce/maintain by nursing if necessary. Please notify Ortho if saturated.  - WBAT  to the LLE  - Pain control and medical management per primary: Orthopedics  - Multimodal pain control ordered    - DVT ppx: ASA 81 mg twice daily  - Ice/Elevate LLE to control pain and edema  - Diet: Adult diet okay from orthopedic perspective  - Continue working with PT/OT    - Okay to discharge to SNF from orthopedic perspective   - F/u with Dr. Beverly on 8/6/2025 at 8:30am  - Please page Ortho on call with any questions        Leo De La Cruz MD  Orthopedic Surgery, PGY-3  [place], [place]

## 2025-07-26 NOTE — PLAN OF CARE
Problem: Discharge Planning  Goal: Discharge to home or other facility with appropriate resources  7/26/2025 1147 by Boris Yang RN  Outcome: Adequate for Discharge  7/26/2025 1058 by Boris Yang RN  Outcome: Progressing  7/26/2025 0628 by Aranza Ortega RN  Outcome: Progressing     Problem: Safety - Adult  Goal: Free from fall injury  7/26/2025 1147 by Boris Yang RN  Outcome: Adequate for Discharge  7/26/2025 1058 by Boris Yang RN  Outcome: Progressing  7/26/2025 0628 by Aranza Ortega RN  Outcome: Progressing     Problem: Pain  Goal: Verbalizes/displays adequate comfort level or baseline comfort level  7/26/2025 1147 by Boris Yang RN  Outcome: Adequate for Discharge  7/26/2025 1058 by Boris Yang RN  Outcome: Progressing  7/26/2025 0628 by Aranza Ortega RN  Outcome: Progressing     Problem: Skin/Tissue Integrity  Goal: Skin integrity remains intact  Description: 1.  Monitor for areas of redness and/or skin breakdown  2.  Assess vascular access sites hourly  3.  Every 4-6 hours minimum:  Change oxygen saturation probe site  4.  Every 4-6 hours:  If on nasal continuous positive airway pressure, respiratory therapy assess nares and determine need for appliance change or resting period  7/26/2025 1147 by Boris Yang RN  Outcome: Adequate for Discharge  7/26/2025 1058 by Boris Yang RN  Outcome: Progressing  7/26/2025 0628 by Aranza Ortega RN  Outcome: Progressing     Problem: ABCDS Injury Assessment  Goal: Absence of physical injury  7/26/2025 1147 by Boris Yang RN  Outcome: Adequate for Discharge  7/26/2025 1058 by Boris Yang RN  Outcome: Progressing  7/26/2025 0628 by Aranza Ortega RN  Outcome: Progressing

## 2025-08-11 ENCOUNTER — OFFICE VISIT (OUTPATIENT)
Dept: ORTHOPEDIC SURGERY | Age: 45
End: 2025-08-11

## 2025-08-11 VITALS — HEIGHT: 73 IN | BODY MASS INDEX: 29.82 KG/M2 | RESPIRATION RATE: 14 BRPM | WEIGHT: 225 LBS

## 2025-08-11 DIAGNOSIS — S72.142A INTERTROCHANTERIC FRACTURE OF LEFT FEMUR, CLOSED, INITIAL ENCOUNTER (HCC): Primary | ICD-10-CM

## 2025-08-15 ENCOUNTER — TELEPHONE (OUTPATIENT)
Dept: ORTHOPEDIC SURGERY | Age: 45
End: 2025-08-15

## 2025-08-21 ENCOUNTER — TELEPHONE (OUTPATIENT)
Dept: ORTHOPEDIC SURGERY | Age: 45
End: 2025-08-21

## 2025-08-21 DIAGNOSIS — S72.142A INTERTROCHANTERIC FRACTURE OF LEFT FEMUR, CLOSED, INITIAL ENCOUNTER (HCC): Primary | ICD-10-CM

## 2025-08-25 DIAGNOSIS — G89.18 POST-OP PAIN: ICD-10-CM

## 2025-08-25 RX ORDER — GABAPENTIN 100 MG/1
100 CAPSULE ORAL 3 TIMES DAILY
Qty: 21 CAPSULE | Refills: 0 | Status: SHIPPED | OUTPATIENT
Start: 2025-08-25 | End: 2025-09-01

## 2025-08-25 RX ORDER — OXYCODONE AND ACETAMINOPHEN 5; 325 MG/1; MG/1
1 TABLET ORAL EVERY 8 HOURS PRN
Qty: 21 TABLET | Refills: 0 | Status: SHIPPED | OUTPATIENT
Start: 2025-08-25 | End: 2025-09-01

## 2025-09-01 DIAGNOSIS — G89.18 POST-OP PAIN: ICD-10-CM

## 2025-09-02 RX ORDER — OXYCODONE AND ACETAMINOPHEN 5; 325 MG/1; MG/1
1 TABLET ORAL 2 TIMES DAILY PRN
Qty: 14 TABLET | Refills: 0 | Status: SHIPPED | OUTPATIENT
Start: 2025-09-02 | End: 2025-09-09

## 2025-09-02 RX ORDER — GABAPENTIN 100 MG/1
100 CAPSULE ORAL 3 TIMES DAILY
Qty: 21 CAPSULE | Refills: 0 | Status: SHIPPED | OUTPATIENT
Start: 2025-09-02 | End: 2025-09-09

## (undated) DEVICE — Device

## (undated) DEVICE — GOWN,SIRUS,NONRNF,SETINSLV,2XL,18/CS: Brand: MEDLINE

## (undated) DEVICE — ROPE TRACTION STERILE 72IN

## (undated) DEVICE — GLOVE ORANGE PI 8   MSG9080

## (undated) DEVICE — GOWN,AURORA,NONREINFORCED,LARGE: Brand: MEDLINE

## (undated) DEVICE — SUTURE ETHILON SZ 2-0 L18IN NONABSORBABLE BLK L26MM PS 3/8 585H

## (undated) DEVICE — 4.0MM SHORT PILOT DRILL WITH AO CONNECTOR: Brand: TRIGEN

## (undated) DEVICE — GLOVE ORTHO 8   MSG9480

## (undated) DEVICE — META-TAN COMPRESSION SCREW DRILL: Brand: TRIGEN

## (undated) DEVICE — 3.0MM X 1000MM BALL TIP GUIDE ROD: Brand: TRIGEN

## (undated) DEVICE — SURGICAL SUCTION CONNECTING TUBE WITH MALE CONNECTOR AND SUCTION CLAMP, 2 FT. LONG (.6 M), 5 MM I.D.: Brand: CONMED

## (undated) DEVICE — SUTURE PDS II SZ 0 L27IN ABSRB VLT L36MM CT-1 1/2 CIR Z340H

## (undated) DEVICE — STOCKINETTE,IMPERVIOUS,12X48,STERILE: Brand: MEDLINE

## (undated) DEVICE — GUIDE PIN 3.2MM X 343MM: Brand: TRIGEN

## (undated) DEVICE — C-ARMOR C-ARM EQUIPMENT COVERS CLEAR STERILE UNIVERSAL FIT 12 PER CASE: Brand: C-ARMOR

## (undated) DEVICE — DRAPE,U/ SHT,SPLIT,PLAS,STERIL: Brand: MEDLINE

## (undated) DEVICE — DRESSING FOAM W4XL4IN AG SIL FACE BORD IONIC ANTIMIC ADH

## (undated) DEVICE — BANDAGE,GAUZE,BULKEE II,4.5"X4.1YD,STRL: Brand: MEDLINE

## (undated) DEVICE — SUTURE MONOCRYL SZ 2-0 L27IN ABSRB UD SH L26MM TAPERPOINT NDL Y417H

## (undated) DEVICE — APPLICATOR MEDICATED 10.5 CC SOLUTION HI LT ORNG CHLORAPREP

## (undated) DEVICE — BLADE CLIPPER GEN PURP NS

## (undated) DEVICE — META-TAN COMPRESSION SCREW                                    STARIGHTER DRILL: Brand: TRIGEN

## (undated) DEVICE — APPLICATOR MEDICATED 26 CC SOLUTION HI LT ORNG CHLORAPREP

## (undated) DEVICE — MITT SURG PREP L ADH DISPOSABLE

## (undated) DEVICE — 3M™ IOBAN™ 2 ANTIMICROBIAL INCISE DRAPE 6650EZ: Brand: IOBAN™ 2